# Patient Record
Sex: FEMALE | Race: WHITE | NOT HISPANIC OR LATINO | Employment: UNEMPLOYED | URBAN - METROPOLITAN AREA
[De-identification: names, ages, dates, MRNs, and addresses within clinical notes are randomized per-mention and may not be internally consistent; named-entity substitution may affect disease eponyms.]

---

## 2017-03-21 ENCOUNTER — APPOINTMENT (OUTPATIENT)
Dept: LAB | Facility: CLINIC | Age: 37
End: 2017-03-21
Payer: COMMERCIAL

## 2017-03-21 ENCOUNTER — ALLSCRIPTS OFFICE VISIT (OUTPATIENT)
Dept: OTHER | Facility: OTHER | Age: 37
End: 2017-03-21

## 2017-03-21 DIAGNOSIS — E83.52 HYPERCALCEMIA: ICD-10-CM

## 2017-03-21 DIAGNOSIS — E21.0 PRIMARY HYPERPARATHYROIDISM (HCC): ICD-10-CM

## 2017-03-21 DIAGNOSIS — Z00.00 ENCOUNTER FOR GENERAL ADULT MEDICAL EXAMINATION WITHOUT ABNORMAL FINDINGS: ICD-10-CM

## 2017-03-21 DIAGNOSIS — N39.0 URINARY TRACT INFECTION: ICD-10-CM

## 2017-03-21 LAB
25(OH)D3 SERPL-MCNC: 33.2 NG/ML (ref 30–100)
ALBUMIN SERPL BCP-MCNC: 3.4 G/DL (ref 3.5–5)
ALP SERPL-CCNC: 60 U/L (ref 46–116)
ALT SERPL W P-5'-P-CCNC: 22 U/L (ref 12–78)
ANION GAP SERPL CALCULATED.3IONS-SCNC: 6 MMOL/L (ref 4–13)
AST SERPL W P-5'-P-CCNC: 11 U/L (ref 5–45)
BACTERIA UR QL AUTO: ABNORMAL /HPF
BASOPHILS # BLD AUTO: 0.04 THOUSANDS/ΜL (ref 0–0.1)
BASOPHILS NFR BLD AUTO: 1 % (ref 0–1)
BILIRUB SERPL-MCNC: 0.38 MG/DL (ref 0.2–1)
BILIRUB UR QL STRIP: NEGATIVE
BUN SERPL-MCNC: 14 MG/DL (ref 5–25)
CALCIUM SERPL-MCNC: 9.1 MG/DL (ref 8.3–10.1)
CHLORIDE SERPL-SCNC: 109 MMOL/L (ref 100–108)
CHOLEST SERPL-MCNC: 243 MG/DL (ref 50–200)
CLARITY UR: ABNORMAL
CO2 SERPL-SCNC: 28 MMOL/L (ref 21–32)
COLOR UR: ABNORMAL
CREAT SERPL-MCNC: 0.8 MG/DL (ref 0.6–1.3)
EOSINOPHIL # BLD AUTO: 0.31 THOUSAND/ΜL (ref 0–0.61)
EOSINOPHIL NFR BLD AUTO: 5 % (ref 0–6)
ERYTHROCYTE [DISTWIDTH] IN BLOOD BY AUTOMATED COUNT: 13.7 % (ref 11.6–15.1)
GFR SERPL CREATININE-BSD FRML MDRD: >60 ML/MIN/1.73SQ M
GLUCOSE P FAST SERPL-MCNC: 82 MG/DL (ref 65–99)
GLUCOSE UR STRIP-MCNC: NEGATIVE MG/DL
HCT VFR BLD AUTO: 40.2 % (ref 34.8–46.1)
HDLC SERPL-MCNC: 70 MG/DL (ref 40–60)
HGB BLD-MCNC: 12.8 G/DL (ref 11.5–15.4)
HGB UR QL STRIP.AUTO: NEGATIVE
KETONES UR STRIP-MCNC: NEGATIVE MG/DL
LDLC SERPL CALC-MCNC: 148 MG/DL (ref 0–100)
LEUKOCYTE ESTERASE UR QL STRIP: NEGATIVE
LYMPHOCYTES # BLD AUTO: 2.11 THOUSANDS/ΜL (ref 0.6–4.47)
LYMPHOCYTES NFR BLD AUTO: 32 % (ref 14–44)
MCH RBC QN AUTO: 28.3 PG (ref 26.8–34.3)
MCHC RBC AUTO-ENTMCNC: 31.8 G/DL (ref 31.4–37.4)
MCV RBC AUTO: 89 FL (ref 82–98)
MONOCYTES # BLD AUTO: 0.5 THOUSAND/ΜL (ref 0.17–1.22)
MONOCYTES NFR BLD AUTO: 8 % (ref 4–12)
NEUTROPHILS # BLD AUTO: 3.58 THOUSANDS/ΜL (ref 1.85–7.62)
NEUTS SEG NFR BLD AUTO: 54 % (ref 43–75)
NITRITE UR QL STRIP: NEGATIVE
NON-SQ EPI CELLS URNS QL MICRO: ABNORMAL /HPF
NRBC BLD AUTO-RTO: 0 /100 WBCS
PH UR STRIP.AUTO: 7 [PH] (ref 4.5–8)
PLATELET # BLD AUTO: 390 THOUSANDS/UL (ref 149–390)
PMV BLD AUTO: 11 FL (ref 8.9–12.7)
POTASSIUM SERPL-SCNC: 4.1 MMOL/L (ref 3.5–5.3)
PROT SERPL-MCNC: 7 G/DL (ref 6.4–8.2)
PROT UR STRIP-MCNC: ABNORMAL MG/DL
PTH-INTACT SERPL-MCNC: 45 PG/ML (ref 14–72)
RBC # BLD AUTO: 4.53 MILLION/UL (ref 3.81–5.12)
RBC #/AREA URNS AUTO: ABNORMAL /HPF
SODIUM SERPL-SCNC: 143 MMOL/L (ref 136–145)
SP GR UR STRIP.AUTO: 1.03 (ref 1–1.03)
TRIGL SERPL-MCNC: 125 MG/DL
TSH SERPL DL<=0.05 MIU/L-ACNC: 0.93 UIU/ML (ref 0.36–3.74)
UROBILINOGEN UR QL STRIP.AUTO: 0.2 E.U./DL
WBC # BLD AUTO: 6.55 THOUSAND/UL (ref 4.31–10.16)
WBC #/AREA URNS AUTO: ABNORMAL /HPF

## 2017-03-21 PROCEDURE — 82306 VITAMIN D 25 HYDROXY: CPT

## 2017-03-21 PROCEDURE — 85025 COMPLETE CBC W/AUTO DIFF WBC: CPT

## 2017-03-21 PROCEDURE — 80061 LIPID PANEL: CPT

## 2017-03-21 PROCEDURE — 83970 ASSAY OF PARATHORMONE: CPT

## 2017-03-21 PROCEDURE — 80053 COMPREHEN METABOLIC PANEL: CPT

## 2017-03-21 PROCEDURE — 84443 ASSAY THYROID STIM HORMONE: CPT

## 2017-03-21 PROCEDURE — 36415 COLL VENOUS BLD VENIPUNCTURE: CPT

## 2017-03-21 PROCEDURE — 81001 URINALYSIS AUTO W/SCOPE: CPT

## 2017-03-22 ENCOUNTER — LAB (OUTPATIENT)
Dept: LAB | Facility: CLINIC | Age: 37
End: 2017-03-22
Payer: COMMERCIAL

## 2017-03-22 ENCOUNTER — TRANSCRIBE ORDERS (OUTPATIENT)
Dept: ADMINISTRATIVE | Facility: HOSPITAL | Age: 37
End: 2017-03-22

## 2017-03-22 DIAGNOSIS — N39.0 URINARY TRACT INFECTION: ICD-10-CM

## 2017-03-22 DIAGNOSIS — Z00.00 ROUTINE GENERAL MEDICAL EXAMINATION AT A HEALTH CARE FACILITY: Primary | ICD-10-CM

## 2017-03-22 PROCEDURE — 87086 URINE CULTURE/COLONY COUNT: CPT

## 2017-03-23 LAB — BACTERIA UR CULT: NORMAL

## 2017-03-29 ENCOUNTER — HOSPITAL ENCOUNTER (OUTPATIENT)
Dept: MRI IMAGING | Facility: HOSPITAL | Age: 37
Discharge: HOME | End: 2017-03-29
Payer: COMMERCIAL

## 2017-03-29 DIAGNOSIS — Z00.00 ROUTINE GENERAL MEDICAL EXAMINATION AT A HEALTH CARE FACILITY: ICD-10-CM

## 2017-03-29 PROCEDURE — 70551 MRI BRAIN STEM W/O DYE: CPT

## 2018-01-10 NOTE — PROGRESS NOTES
Assessment    1  Encounter for preventive health examination (V70 0) (Z00 00)    Plan  Health Maintenance    · Valtrex 500 MG Oral Tablet (ValACYclovir HCl)   · (1) LIPID PANEL FASTING W DIRECT LDL REFLEX; Status:Active; Requested  GPI:[id number]; Herpes simplex labialis    · ValACYclovir HCl - 500 MG Oral Tablet; TAKE 1 TABLET TWICE DAILY  Hypercalcemia, Hyperparathyroidism, primary    · (1) TSH WITH FT4 REFLEX; Status:Active; Requested for:21Mar2017;   Hyperparathyroidism, primary    · (1) CBC/PLT/DIFF; Status:Active; Requested for:21Mar2017;    · (1) COMPREHENSIVE METABOLIC PANEL; Status:Active; Requested for:21Mar2017;    · (1) PTH N-TERMINAL (INTACT); Status:Active; Requested for:21Mar2017;    · (1) URINALYSIS (will reflex a microscopy if leukocytes, occult blood, protein or nitrites  are not within normal limits); Status:Active; Requested for:21Mar2017;    · (1) VITAMIN D 25-HYDROXY; Status:Active; Requested for:21Mar2017;    · * DXA BONE DENSITY SPINE HIP AND PELVIS; Status:Hold For - Scheduling;  Requested for:21Mar2017;   PMH: History of pneumonia    · Cefuroxime Axetil 500 MG Oral Tablet   · Pulmicort Flexhaler 180 MCG/ACT Inhalation Aerosol Powder Breath Activated   · Zithromax Z-Jean Carlos 250 MG Oral Tablet (Azithromycin)    Discussion/Summary  health maintenance visit Currently, she eats a healthy diet  cervical cancer screening is needed every year Breast cancer screening: breast cancer screening is not indicated  Colorectal cancer screening: colorectal cancer screening is not indicated  Osteoporosis screening: bone mineral density testing has been ordered  Screening lab work includes thyroid function testing and 25-hydroxyvitamin D  Laboratory testing may be done today  Valtrex renewed  Follow-up visit yearly or as needed  Bone density recommended  History of Present Illness  HM, Adult Female: The patient is being seen for a health maintenance evaluation   The last health maintenance visit was 2 year(s) ago  General Health: The patient's health since the last visit is described as fair  She has regular dental visits  She denies vision problems  She denies hearing loss  Lifestyle:  She consumes a diverse and healthy diet  She does not have any weight concerns  Reproductive health: the patient is premenopausal    Screening:   metabolic screening reviewed and updated  risk screening reviewed and updated  HPI: A young woman for a maintenance examination  She has had more than her fair share of health issues considering her healthy lifestyle  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; she's had no symptoms since that time  She only recently had surgical removal of a right lower parathyroid adenoma and needs a laboratory reassessment  She gets periodic outbreaks of herpes type I of the face and lips treated without sequelae or  Review of Systems    Constitutional: no fever and no chills  ENT: no nasal discharge  Cardiovascular: no chest pain and no palpitations  Respiratory: no cough and no shortness of breath during exertion  Gastrointestinal: abdominal pain, but no nausea  Genitourinary: no dysuria and no dysmenorrhea  Musculoskeletal: no arthralgias  Integumentary: a rash, but as noted in HPI  Neurological: no headache and no fainting  Psychiatric: no anxiety and no depression  Hematologic/Lymphatic: no swollen glands, no tendency for easy bleeding and no tendency for easy bruising  Active Problems    1  Herpes simplex labialis (054 9) (B00 1)   2  Hypercalcemia (275 42) (E83 52)   3   Hyperparathyroidism, primary (252 01) (E21 0)    Past Medical History    · History of Acute upper respiratory infection (465 9) (J06 9)   · History of Brain Cancer (V10 85)   · History of Cough (786 2) (R05)   · History of acute pharyngitis (V12 69) (Z87 09)   · History of benign neoplasm of skin (V13 3) (Z87 2)   · History of influenza (V12 09) (Z87 09)   · History of shortness of breath (V13 89) (Z87 898)   · History of shortness of breath (V13 89) (Z87 898)   · History of upper respiratory infection (V12 09) (Z87 09)    Surgical History    · History of Brain Surgery   · History of  Section   · History of Foot Surgery    Family History  Mother    · Family history of Chronic Obstructive Pulmonary Disease   · Family history of Diabetes Mellitus (V18 0)   · Family history of Hypertension (V17 49)   · Family history of Pure Hypercholesterolemia   · Family history of Stroke Syndrome (V17 1)  Father    · Family history of Alcoholism   · Family history of Cirrhosis   · Family history of Diabetes Mellitus (V18 0)   · Family history of Father  At Age ___   · Family history of Heart Disease (V17 49)   · Family history of Hypertension (V17 49)  Sister    · Family history of Thyroid Disorder (V18 19)  Maternal Grandmother    · Family history of Colon Cancer (V16 0)   · Family history of Ovarian Cancer (V16 41)    Social History    · Alcohol Use (History)   · RARELY   · Never A Smoker    Current Meds   1  Cefuroxime Axetil 500 MG Oral Tablet; 1 tablet bid for 10 days; Therapy: [id number] to (Last 493 35 13)  Requested for: 44MGZ8028 Ordered   2  Multi-Vitamin TABS; Therapy: (Recorded:28Ajq7279) to Recorded   3  Ortho Tri-Cyclen TABS; TAKE 1 TABLET DAILY; Therapy: (Recorded:30Qcc9469) to Recorded   4  Pulmicort Flexhaler 180 MCG/ACT Inhalation Aerosol Powder Breath Activated; INHALE   1 PUFF TWICE DAILY  RINSE MOUTH AFTER USE; Therapy: [id number] to (Last 493 35 13)  Requested for: 468 06 892 Ordered   5  ValACYclovir HCl - 500 MG Oral Tablet; TAKE 1 TABLET TWICE DAILY; Therapy: 92TFL5159 to (Evaluate:24Pxf0256)  Requested for: 13VFG2441; Last   Rx:2016 Ordered   6  Valtrex 500 MG Oral Tablet; TAKE 1 TABLET TWICE DAILY  Requested for: 53XGO4787; Last Rx:2015 Ordered   7  Zithromax Z-Jean Carlos 250 MG Oral Tablet; TAKE 2 TABLETS ON DAY 1 THEN TAKE 1   TABLET A DAY FOR 4 DAYS; Therapy: 04SLL4155 to (Last Rx:23Nov2015)  Requested for: 23Nov2015 Ordered    Allergies    1  No Known Drug Allergies    2  Seasonal    Vitals   Recorded: 21Mar2017 09:29AM   Heart Rate 80   Systolic 549   Diastolic 80   Height 5 ft 2 in   Weight 140 lb 6 oz   BMI Calculated 25 68   BSA Calculated 1 64     Physical Exam    Constitutional   General appearance: No acute distress, well appearing and well nourished  Head and Face   Head and face: Normal     Eyes   Conjunctiva and lids: No swelling, erythema or discharge  Ophthalmoscopic examination: Normal fundi and optic discs  Neck   Neck: Supple, symmetric, trachea midline, no masses  Thyroid: Normal, no thyromegaly  Pulmonary   Auscultation of lungs: Clear to auscultation  Cardiovascular   Auscultation of heart: Normal rate and rhythm, normal S1 and S2, no murmurs  Musculoskeletal   Gait and station: Normal     Digits and nails: Normal without clubbing or cyanosis  Joints, bones, and muscles: Normal     Range of motion: Normal     Stability: Normal     Muscle strength/tone: Normal     Skin   Skin and subcutaneous tissue: Abnormal   Horizontal surgical scar of the base of the neck consistent with recent parathyroid surgery  Neurologic   Cortical function: Normal mental status  Psychiatric   Judgment and insight: Normal     Orientation to person, place, and time: Normal     Recent and remote memory: Intact      Mood and affect: Normal        Signatures   Electronically signed by : DEJUAN Dunn ; Mar 21 2017  9:50AM EST                       (Author)

## 2018-01-12 VITALS
HEIGHT: 62 IN | WEIGHT: 140.38 LBS | SYSTOLIC BLOOD PRESSURE: 102 MMHG | DIASTOLIC BLOOD PRESSURE: 80 MMHG | HEART RATE: 80 BPM | BODY MASS INDEX: 25.83 KG/M2

## 2018-03-06 ENCOUNTER — OFFICE VISIT (OUTPATIENT)
Dept: INTERNAL MEDICINE CLINIC | Facility: CLINIC | Age: 38
End: 2018-03-06
Payer: COMMERCIAL

## 2018-03-06 VITALS
OXYGEN SATURATION: 98 % | TEMPERATURE: 100.3 F | WEIGHT: 150 LBS | HEIGHT: 63 IN | HEART RATE: 102 BPM | BODY MASS INDEX: 26.58 KG/M2 | DIASTOLIC BLOOD PRESSURE: 84 MMHG | RESPIRATION RATE: 18 BRPM | SYSTOLIC BLOOD PRESSURE: 118 MMHG

## 2018-03-06 DIAGNOSIS — J11.1 FLU: Primary | ICD-10-CM

## 2018-03-06 PROCEDURE — 3008F BODY MASS INDEX DOCD: CPT | Performed by: PHYSICIAN ASSISTANT

## 2018-03-06 PROCEDURE — 99214 OFFICE O/P EST MOD 30 MIN: CPT | Performed by: PHYSICIAN ASSISTANT

## 2018-03-06 RX ORDER — OSELTAMIVIR PHOSPHATE 75 MG/1
75 CAPSULE ORAL EVERY 12 HOURS SCHEDULED
Qty: 10 CAPSULE | Refills: 0 | Status: SHIPPED | OUTPATIENT
Start: 2018-03-06 | End: 2018-03-11

## 2018-03-06 RX ORDER — BENZONATATE 200 MG/1
200 CAPSULE ORAL 3 TIMES DAILY PRN
Qty: 30 CAPSULE | Refills: 0 | Status: SHIPPED | OUTPATIENT
Start: 2018-03-06 | End: 2018-03-14 | Stop reason: ALTCHOICE

## 2018-03-06 RX ORDER — PROMETHAZINE HYDROCHLORIDE AND CODEINE PHOSPHATE 6.25; 1 MG/5ML; MG/5ML
5 SYRUP ORAL EVERY 4 HOURS PRN
Qty: 240 ML | Refills: 0 | Status: SHIPPED | OUTPATIENT
Start: 2018-03-06 | End: 2018-03-14 | Stop reason: ALTCHOICE

## 2018-03-06 RX ORDER — NORGESTIMATE AND ETHINYL ESTRADIOL
1 KIT DAILY
Refills: 4 | COMMUNITY
Start: 2018-02-19 | End: 2021-11-24

## 2018-03-06 RX ORDER — VALACYCLOVIR HYDROCHLORIDE 500 MG/1
500 TABLET, FILM COATED ORAL 2 TIMES DAILY
Refills: 1 | COMMUNITY
Start: 2017-12-26 | End: 2018-03-14 | Stop reason: SDUPTHER

## 2018-03-06 NOTE — PROGRESS NOTES
Assessment/Plan:    Flu-  Tamiflu, tessalon and phenergan with codiene  Rest and fluids    No problem-specific Assessment & Plan notes found for this encounter  Diagnoses and all orders for this visit:    Flu  -     oseltamivir (TAMIFLU) 75 mg capsule; Take 1 capsule (75 mg total) by mouth every 12 (twelve) hours for 5 days  -     promethazine-codeine (PHENERGAN WITH CODEINE) 6 25-10 mg/5 mL syrup; Take 5 mL by mouth every 4 (four) hours as needed for cough  -     benzonatate (TESSALON) 200 MG capsule; Take 1 capsule (200 mg total) by mouth 3 (three) times a day as needed for cough    Other orders  -     TRI-LO-MACIE 0 18/0 215/0 25 MG-25 MCG per tablet; Take 1 tablet by mouth daily  -     valACYclovir (VALTREX) 500 mg tablet; Take 500 mg by mouth 2 (two) times a day          Subjective:      Patient ID: Kalina Cruz is a 42 y o  female  Patient presents with symptoms that started 2 days ago  It began as headache, body aches and sweats  She did not take her temperature but felt like she had a fever  She also had some nausea but no vomiting  No diarrhea  She then developed a cough which is dry  She has a clear nasal discharge, no ear pain  Today her temperature is a 100 3  She did not take any antipyretics today  She did not take her temperature at home 2 days ago but felt like her temperature was higher  Sore Throat    Associated symptoms include coughing  Pertinent negatives include no abdominal pain, congestion, diarrhea, ear pain, shortness of breath or vomiting  Generalized Body Aches   The current episode started in the past 7 days  The problem occurs constantly  The problem is unchanged  Associated symptoms include rhinorrhea, a sore throat, fatigue, a fever, coughing and nausea  Pertinent negatives include no congestion, ear pain, chest pain, shortness of breath, wheezing, abdominal pain, diarrhea or vomiting     Fatigue   Associated symptoms include chills, coughing, diaphoresis, fatigue, a fever, myalgias, nausea and a sore throat  Pertinent negatives include no abdominal pain, chest pain, congestion or vomiting  The following portions of the patient's history were reviewed and updated as appropriate: allergies, current medications, past family history, past medical history, past social history, past surgical history and problem list     Review of Systems   Constitutional: Positive for chills, diaphoresis, fatigue and fever  HENT: Positive for rhinorrhea and sore throat  Negative for congestion and ear pain  Respiratory: Positive for cough  Negative for chest tightness, shortness of breath and wheezing  Cardiovascular: Negative for chest pain and palpitations  Gastrointestinal: Positive for nausea  Negative for abdominal pain, diarrhea and vomiting  Musculoskeletal: Positive for myalgias  Objective:      /84   Pulse 102   Temp 100 3 °F (37 9 °C)   Resp 18   Ht 5' 2 75" (1 594 m)   Wt 68 kg (150 lb)   SpO2 98%   BMI 26 78 kg/m²          Physical Exam   Constitutional: She appears well-developed and well-nourished  HENT:   Head: Normocephalic and atraumatic  Right Ear: External ear normal    Left Ear: External ear normal    Mouth/Throat: Oropharynx is clear and moist    Eyes: Conjunctivae are normal  Pupils are equal, round, and reactive to light  Neck: Normal range of motion  Neck supple  Cardiovascular: Regular rhythm and normal heart sounds  Tachycardia present  Pulmonary/Chest: Effort normal and breath sounds normal  No respiratory distress  She has no wheezes  Abdominal: Soft  Bowel sounds are normal  There is no tenderness  Lymphadenopathy:     She has no cervical adenopathy

## 2018-03-09 RX ORDER — MULTIVITAMIN
TABLET ORAL
COMMUNITY
End: 2018-03-14 | Stop reason: ALTCHOICE

## 2018-03-14 ENCOUNTER — LAB (OUTPATIENT)
Dept: LAB | Facility: CLINIC | Age: 38
End: 2018-03-14
Payer: COMMERCIAL

## 2018-03-14 ENCOUNTER — OFFICE VISIT (OUTPATIENT)
Dept: INTERNAL MEDICINE CLINIC | Facility: CLINIC | Age: 38
End: 2018-03-14
Payer: COMMERCIAL

## 2018-03-14 VITALS
SYSTOLIC BLOOD PRESSURE: 110 MMHG | OXYGEN SATURATION: 98 % | BODY MASS INDEX: 27.86 KG/M2 | HEART RATE: 92 BPM | WEIGHT: 151.4 LBS | HEIGHT: 62 IN | DIASTOLIC BLOOD PRESSURE: 70 MMHG

## 2018-03-14 DIAGNOSIS — C71.9 MALIGNANT NEOPLASM OF BRAIN, UNSPECIFIED LOCATION (HCC): ICD-10-CM

## 2018-03-14 DIAGNOSIS — E21.0 HYPERPARATHYROIDISM, PRIMARY (HCC): Primary | ICD-10-CM

## 2018-03-14 DIAGNOSIS — E21.0 HYPERPARATHYROIDISM, PRIMARY (HCC): ICD-10-CM

## 2018-03-14 DIAGNOSIS — B00.9 HERPES SIMPLEX: ICD-10-CM

## 2018-03-14 LAB
ALBUMIN SERPL BCP-MCNC: 3.3 G/DL (ref 3.5–5)
ALP SERPL-CCNC: 51 U/L (ref 46–116)
ALT SERPL W P-5'-P-CCNC: 17 U/L (ref 12–78)
ANION GAP SERPL CALCULATED.3IONS-SCNC: 7 MMOL/L (ref 4–13)
AST SERPL W P-5'-P-CCNC: 13 U/L (ref 5–45)
BACTERIA UR QL AUTO: ABNORMAL /HPF
BASOPHILS # BLD AUTO: 0.01 THOUSANDS/ΜL (ref 0–0.1)
BASOPHILS NFR BLD AUTO: 0 % (ref 0–1)
BILIRUB SERPL-MCNC: 0.65 MG/DL (ref 0.2–1)
BILIRUB UR QL STRIP: NEGATIVE
BUN SERPL-MCNC: 13 MG/DL (ref 5–25)
CALCIUM SERPL-MCNC: 8.8 MG/DL (ref 8.3–10.1)
CHLORIDE SERPL-SCNC: 105 MMOL/L (ref 100–108)
CHOLEST SERPL-MCNC: 229 MG/DL (ref 50–200)
CLARITY UR: CLEAR
CO2 SERPL-SCNC: 27 MMOL/L (ref 21–32)
COLOR UR: ABNORMAL
CREAT SERPL-MCNC: 0.7 MG/DL (ref 0.6–1.3)
EOSINOPHIL # BLD AUTO: 0.14 THOUSAND/ΜL (ref 0–0.61)
EOSINOPHIL NFR BLD AUTO: 2 % (ref 0–6)
ERYTHROCYTE [DISTWIDTH] IN BLOOD BY AUTOMATED COUNT: 13.1 % (ref 11.6–15.1)
GFR SERPL CREATININE-BSD FRML MDRD: 109 ML/MIN/1.73SQ M
GLUCOSE P FAST SERPL-MCNC: 80 MG/DL (ref 65–99)
GLUCOSE UR STRIP-MCNC: NEGATIVE MG/DL
HCT VFR BLD AUTO: 38.1 % (ref 34.8–46.1)
HDLC SERPL-MCNC: 41 MG/DL (ref 40–60)
HGB BLD-MCNC: 12.3 G/DL (ref 11.5–15.4)
HGB UR QL STRIP.AUTO: NEGATIVE
KETONES UR STRIP-MCNC: NEGATIVE MG/DL
LDLC SERPL CALC-MCNC: 114 MG/DL (ref 0–100)
LEUKOCYTE ESTERASE UR QL STRIP: NEGATIVE
LYMPHOCYTES # BLD AUTO: 2 THOUSANDS/ΜL (ref 0.6–4.47)
LYMPHOCYTES NFR BLD AUTO: 27 % (ref 14–44)
MCH RBC QN AUTO: 28.5 PG (ref 26.8–34.3)
MCHC RBC AUTO-ENTMCNC: 32.3 G/DL (ref 31.4–37.4)
MCV RBC AUTO: 88 FL (ref 82–98)
MONOCYTES # BLD AUTO: 0.57 THOUSAND/ΜL (ref 0.17–1.22)
MONOCYTES NFR BLD AUTO: 8 % (ref 4–12)
MUCOUS THREADS UR QL AUTO: ABNORMAL
NEUTROPHILS # BLD AUTO: 4.74 THOUSANDS/ΜL (ref 1.85–7.62)
NEUTS SEG NFR BLD AUTO: 63 % (ref 43–75)
NITRITE UR QL STRIP: NEGATIVE
NON-SQ EPI CELLS URNS QL MICRO: ABNORMAL /HPF
NRBC BLD AUTO-RTO: 0 /100 WBCS
PH UR STRIP.AUTO: 7 [PH] (ref 4.5–8)
PLATELET # BLD AUTO: 403 THOUSANDS/UL (ref 149–390)
PMV BLD AUTO: 10.5 FL (ref 8.9–12.7)
POTASSIUM SERPL-SCNC: 4.1 MMOL/L (ref 3.5–5.3)
PROT SERPL-MCNC: 6.9 G/DL (ref 6.4–8.2)
PROT UR STRIP-MCNC: ABNORMAL MG/DL
RBC # BLD AUTO: 4.32 MILLION/UL (ref 3.81–5.12)
RBC #/AREA URNS AUTO: ABNORMAL /HPF
SODIUM SERPL-SCNC: 139 MMOL/L (ref 136–145)
SP GR UR STRIP.AUTO: 1.03 (ref 1–1.03)
TRIGL SERPL-MCNC: 368 MG/DL
TSH SERPL DL<=0.05 MIU/L-ACNC: 0.87 UIU/ML (ref 0.36–3.74)
UROBILINOGEN UR QL STRIP.AUTO: 0.2 E.U./DL
WBC # BLD AUTO: 7.48 THOUSAND/UL (ref 4.31–10.16)
WBC #/AREA URNS AUTO: ABNORMAL /HPF

## 2018-03-14 PROCEDURE — 84443 ASSAY THYROID STIM HORMONE: CPT

## 2018-03-14 PROCEDURE — 36415 COLL VENOUS BLD VENIPUNCTURE: CPT

## 2018-03-14 PROCEDURE — 3725F SCREEN DEPRESSION PERFORMED: CPT | Performed by: INTERNAL MEDICINE

## 2018-03-14 PROCEDURE — 80061 LIPID PANEL: CPT

## 2018-03-14 PROCEDURE — 85025 COMPLETE CBC W/AUTO DIFF WBC: CPT

## 2018-03-14 PROCEDURE — 1036F TOBACCO NON-USER: CPT | Performed by: INTERNAL MEDICINE

## 2018-03-14 PROCEDURE — 99214 OFFICE O/P EST MOD 30 MIN: CPT | Performed by: INTERNAL MEDICINE

## 2018-03-14 PROCEDURE — 81001 URINALYSIS AUTO W/SCOPE: CPT | Performed by: INTERNAL MEDICINE

## 2018-03-14 PROCEDURE — 80053 COMPREHEN METABOLIC PANEL: CPT

## 2018-03-14 RX ORDER — VALACYCLOVIR HYDROCHLORIDE 500 MG/1
500 TABLET, FILM COATED ORAL 2 TIMES DAILY
Qty: 180 TABLET | Refills: 3 | Status: SHIPPED | OUTPATIENT
Start: 2018-03-14 | End: 2019-01-16 | Stop reason: SDUPTHER

## 2018-03-14 NOTE — PROGRESS NOTES
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 treated without sequelae               The following portions of the patient's history were reviewed and updated as appropriate:   She has a past medical history of Benign neoplasm of skin; Brain cancer ([facility] [address] ); Hypercalcemia; and Hyperparathyroidism, primary ([facility] [address] )  ,   does not have a problem list on file  ,   has a past surgical history that includes Brain surgery;  section; and Foot surgery  ,  family history includes Alcohol abuse in her father; COPD in her mother; Cirrhosis in her father; Colon cancer in her maternal grandmother; Diabetes in her father and mother; Heart disease in her father and mother; Hyperlipidemia in her mother; Hypertension in her father and mother; Ovarian cancer in her maternal grandmother; Stroke in her mother; Thyroid disease in her sister  ,   reports that she has quit smoking  She has never used smokeless tobacco  She reports that she does not drink alcohol or use drugs  ,  has No Known Allergies     Current Outpatient Prescriptions   Medication Sig Dispense Refill    TRI-LO-MACIE 0 18/0 215/0 25 MG-25 MCG per tablet Take 1 tablet by mouth daily  4    valACYclovir (VALTREX) 500 mg tablet Take 500 mg by mouth 2 (two) times a day  1     No current facility-administered medications for this visit          Review of Systems   Constitutional: Negative for activity change, appetite change and fever  HENT: Negative for sore throat and tinnitus  Eyes: Negative for pain and visual disturbance  Respiratory: Negative for cough, shortness of breath and wheezing  Cardiovascular: Negative for chest pain and palpitations  Gastrointestinal: Negative for abdominal pain, blood in stool, constipation, diarrhea, nausea and vomiting  Endocrine: Negative for heat intolerance, polydipsia, polyphagia and polyuria  Genitourinary: Negative for dysuria, frequency, vaginal bleeding and vaginal discharge  Musculoskeletal: Negative for arthralgias  Skin: Negative for color change and wound  Allergic/Immunologic: Negative for immunocompromised state  Neurological: Negative for dizziness, seizures, syncope and headaches  Hematological: Negative for adenopathy  Does not bruise/bleed easily  Psychiatric/Behavioral: Negative for agitation, confusion and suicidal ideas  Objective:  Vitals:    03/14/18 0902   BP: 110/70   Pulse: 92   SpO2: 98%      Physical Exam   Constitutional: She is oriented to person, place, and time  She appears well-developed and well-nourished  No distress  Appears stated age     HENT:   Head: Normocephalic and atraumatic  Eyes: Conjunctivae are normal  Pupils are equal, round, and reactive to light  Neck: Normal range of motion  No thyromegaly present  Cardiovascular: Normal rate, regular rhythm and normal heart sounds  No murmur heard  Pulmonary/Chest: Effort normal  No respiratory distress  She has no wheezes  She has no rales  Abdominal: Soft  There is no tenderness  Musculoskeletal: Normal range of motion  She exhibits no deformity  Neurological: She is alert and oriented to person, place, and time  Skin: Skin is warm and dry  Psychiatric: She has a normal mood and affect   Her behavior is normal  Judgment and thought content normal

## 2018-03-14 NOTE — PATIENT INSTRUCTIONS
A relatively young woman who, as noted, has had more than her fair share of issues but who with this time is doing well  Surveillance MRI  Surveillance laboratory testing  Removal of bowel sec live ear for treatment/prevention of recurrent herpes simplex type 1    Follow-up here in a year or earlier if needed

## 2018-03-15 DIAGNOSIS — E88.09 HYPOALBUMINEMIA: Primary | ICD-10-CM

## 2018-03-16 ENCOUNTER — LAB (OUTPATIENT)
Dept: LAB | Facility: CLINIC | Age: 38
End: 2018-03-16
Payer: COMMERCIAL

## 2018-03-16 DIAGNOSIS — E88.09 HYPOALBUMINEMIA: ICD-10-CM

## 2018-03-16 LAB
IGA SERPL-MCNC: 151 MG/DL (ref 70–400)
IGG SERPL-MCNC: 625 MG/DL (ref 700–1600)
IGM SERPL-MCNC: 86 MG/DL (ref 40–230)

## 2018-03-16 PROCEDURE — 82784 ASSAY IGA/IGD/IGG/IGM EACH: CPT

## 2018-03-16 PROCEDURE — 84165 PROTEIN E-PHORESIS SERUM: CPT | Performed by: PATHOLOGY

## 2018-03-16 PROCEDURE — 84165 PROTEIN E-PHORESIS SERUM: CPT

## 2018-03-16 PROCEDURE — 36415 COLL VENOUS BLD VENIPUNCTURE: CPT

## 2018-03-17 ENCOUNTER — APPOINTMENT (OUTPATIENT)
Dept: LAB | Facility: CLINIC | Age: 38
End: 2018-03-17
Payer: COMMERCIAL

## 2018-03-17 ENCOUNTER — TRANSCRIBE ORDERS (OUTPATIENT)
Dept: LAB | Facility: CLINIC | Age: 38
End: 2018-03-17

## 2018-03-17 DIAGNOSIS — E88.09 HYPOALBUMINEMIA: ICD-10-CM

## 2018-03-17 DIAGNOSIS — E88.09 HYPOALBUMINEMIA: Primary | ICD-10-CM

## 2018-03-17 PROCEDURE — 84156 ASSAY OF PROTEIN URINE: CPT

## 2018-03-18 LAB
PROT 24H UR-MCNC: 128 MG/24 HRS (ref 40–150)
SPECIMEN VOL UR: 1600 ML

## 2018-03-19 LAB
ALBUMIN SERPL ELPH-MCNC: 3.84 G/DL (ref 3.5–5)
ALBUMIN SERPL ELPH-MCNC: 59 % (ref 52–65)
ALPHA1 GLOB SERPL ELPH-MCNC: 0.38 G/DL (ref 0.1–0.4)
ALPHA1 GLOB SERPL ELPH-MCNC: 5.8 % (ref 2.5–5)
ALPHA2 GLOB SERPL ELPH-MCNC: 0.85 G/DL (ref 0.4–1.2)
ALPHA2 GLOB SERPL ELPH-MCNC: 13 % (ref 7–13)
BETA GLOB ABNORMAL SERPL ELPH-MCNC: 0.55 G/DL (ref 0.4–0.8)
BETA1 GLOB SERPL ELPH-MCNC: 8.5 % (ref 5–13)
BETA2 GLOB SERPL ELPH-MCNC: 4.6 % (ref 2–8)
BETA2+GAMMA GLOB SERPL ELPH-MCNC: 0.3 G/DL (ref 0.2–0.5)
GAMMA GLOB ABNORMAL SERPL ELPH-MCNC: 0.59 G/DL (ref 0.5–1.6)
GAMMA GLOB SERPL ELPH-MCNC: 9.1 % (ref 12–22)
IGG/ALB SER: 1.44 {RATIO} (ref 1.1–1.8)
PROT PATTERN SERPL ELPH-IMP: ABNORMAL
PROT SERPL-MCNC: 6.5 G/DL (ref 6.4–8.2)

## 2018-03-23 ENCOUNTER — HOSPITAL ENCOUNTER (OUTPATIENT)
Dept: MRI IMAGING | Facility: CLINIC | Age: 38
Discharge: HOME | End: 2018-03-23
Payer: COMMERCIAL

## 2018-03-23 DIAGNOSIS — C71.9 MALIGNANT NEOPLASM OF BRAIN, UNSPECIFIED LOCATION (HCC): ICD-10-CM

## 2018-03-23 PROCEDURE — 70551 MRI BRAIN STEM W/O DYE: CPT

## 2018-03-24 ENCOUNTER — TELEPHONE (OUTPATIENT)
Dept: INTERNAL MEDICINE CLINIC | Facility: CLINIC | Age: 38
End: 2018-03-24

## 2018-03-24 NOTE — TELEPHONE ENCOUNTER
----- Message from Radha Alaniz MA sent at 3/24/2018  9:17 AM EDT -----  Called patient with results      ----- Message -----  From: Shara Pal MD  Sent: 3/23/2018   6:22 PM  To: Wilian Hernandez  Internal Med Clinical    Continued stable postoperative changes in the brain MRI

## 2019-01-16 ENCOUNTER — LAB (OUTPATIENT)
Dept: LAB | Facility: CLINIC | Age: 39
End: 2019-01-16
Payer: COMMERCIAL

## 2019-01-16 ENCOUNTER — OFFICE VISIT (OUTPATIENT)
Dept: INTERNAL MEDICINE CLINIC | Facility: CLINIC | Age: 39
End: 2019-01-16
Payer: COMMERCIAL

## 2019-01-16 VITALS
OXYGEN SATURATION: 98 % | HEART RATE: 112 BPM | BODY MASS INDEX: 28.35 KG/M2 | WEIGHT: 155 LBS | SYSTOLIC BLOOD PRESSURE: 121 MMHG | DIASTOLIC BLOOD PRESSURE: 90 MMHG

## 2019-01-16 DIAGNOSIS — Z72.51 HIGH RISK HETEROSEXUAL BEHAVIOR: ICD-10-CM

## 2019-01-16 DIAGNOSIS — B00.9 HERPES SIMPLEX: ICD-10-CM

## 2019-01-16 DIAGNOSIS — Z72.51 HIGH RISK HETEROSEXUAL BEHAVIOR: Primary | ICD-10-CM

## 2019-01-16 LAB
C TRACH DNA SPEC QL NAA+PROBE: NEGATIVE
N GONORRHOEA DNA SPEC QL NAA+PROBE: NEGATIVE

## 2019-01-16 PROCEDURE — 87491 CHLMYD TRACH DNA AMP PROBE: CPT

## 2019-01-16 PROCEDURE — 86592 SYPHILIS TEST NON-TREP QUAL: CPT

## 2019-01-16 PROCEDURE — 87591 N.GONORRHOEAE DNA AMP PROB: CPT

## 2019-01-16 PROCEDURE — 36415 COLL VENOUS BLD VENIPUNCTURE: CPT

## 2019-01-16 PROCEDURE — 87389 HIV-1 AG W/HIV-1&-2 AB AG IA: CPT

## 2019-01-16 PROCEDURE — 86803 HEPATITIS C AB TEST: CPT

## 2019-01-16 PROCEDURE — 99213 OFFICE O/P EST LOW 20 MIN: CPT | Performed by: INTERNAL MEDICINE

## 2019-01-16 RX ORDER — VALACYCLOVIR HYDROCHLORIDE 500 MG/1
500 TABLET, FILM COATED ORAL 2 TIMES DAILY
Qty: 180 TABLET | Refills: 0 | Status: SHIPPED | OUTPATIENT
Start: 2019-01-16 | End: 2019-04-17 | Stop reason: SDUPTHER

## 2019-01-16 NOTE — PROGRESS NOTES
Assessment/Plan:       Diagnoses and all orders for this visit:    High risk heterosexual behavior  -     RPR; Future  -     HIV 1/2 AG-AB combo; Future  -     Chlamydia/GC amplified DNA by PCR; Future  -     Hepatitis C antibody; Future    Herpes simplex  -     valACYclovir (VALTREX) 500 mg tablet; Take 1 tablet (500 mg total) by mouth 2 (two) times a day          Patient Instructions   History of noted above  STDs screens with recheck in 6 weeks  Subjective:      Patient ID: Elisabeth Figueroa is a 34 y o  female  A female patient with no prior STD history presents  2 weeks ago she had a single episode of unprotected sex with a male partner other than her   She is asymptomatic and would like to be checked for STDs  No history of HIV, no history of hep C    Asymptomatic  No flu-like symptoms  No vaginal symptoms or lesions  The following portions of the patient's history were reviewed and updated as appropriate:   She has a past medical history of Benign neoplasm of skin; Brain cancer ([facility] [address] ); and Hyperparathyroidism, primary ([facility] )  ,   does not have any pertinent problems on file  ,   has a past surgical history that includes Brain surgery;  section; and Foot surgery  ,  family history includes Alcohol abuse in her father; COPD in her mother; Cirrhosis in her father; Colon cancer in her maternal grandmother; Diabetes in her father and mother; Heart disease in her father and mother; Hyperlipidemia in her mother; Hypertension in her father and mother; Ovarian cancer in her maternal grandmother; Stroke in her mother; Thyroid disease in her sister  ,   reports that she has quit smoking  She has never used smokeless tobacco  She reports that she does not drink alcohol or use drugs  ,  has No Known Allergies     Current Outpatient Prescriptions   Medication Sig Dispense Refill    TRI-LO-MACIE 0 18/0 215/0 25 MG-25 MCG per tablet Take 1 tablet by mouth daily  4    valACYclovir (VALTREX) 500 mg tablet Take 1 tablet (500 mg total) by mouth 2 (two) times a day 180 tablet 0     No current facility-administered medications for this visit  Review of Systems   Psychiatric/Behavioral: Positive for dysphoric mood  The patient is nervous/anxious  All other systems reviewed and are negative  Objective:  Vitals:    01/16/19 1302   BP: 121/90   Pulse: (!) 112   SpO2: 98%      Physical Exam   Constitutional: She appears well-developed and well-nourished  Pulmonary/Chest: Effort normal    Neurological: She is alert  Psychiatric: She exhibits a depressed mood

## 2019-01-17 LAB
HCV AB SER QL: NORMAL
RPR SER QL: NORMAL

## 2019-01-18 LAB — HIV 1+2 AB+HIV1 P24 AG SERPL QL IA: NORMAL

## 2019-02-28 ENCOUNTER — OFFICE VISIT (OUTPATIENT)
Dept: INTERNAL MEDICINE CLINIC | Facility: CLINIC | Age: 39
End: 2019-02-28
Payer: COMMERCIAL

## 2019-02-28 VITALS
HEIGHT: 62 IN | OXYGEN SATURATION: 98 % | DIASTOLIC BLOOD PRESSURE: 78 MMHG | WEIGHT: 156 LBS | BODY MASS INDEX: 28.71 KG/M2 | HEART RATE: 81 BPM | SYSTOLIC BLOOD PRESSURE: 132 MMHG

## 2019-02-28 DIAGNOSIS — F41.8 ANXIOUS DEPRESSION: Primary | ICD-10-CM

## 2019-02-28 PROBLEM — Z72.51 HIGH RISK HETEROSEXUAL BEHAVIOR: Status: RESOLVED | Noted: 2019-01-16 | Resolved: 2019-02-28

## 2019-02-28 PROBLEM — C71.9 BRAIN CANCER (HCC): Status: RESOLVED | Noted: 2018-03-14 | Resolved: 2019-02-28

## 2019-02-28 PROCEDURE — 99213 OFFICE O/P EST LOW 20 MIN: CPT | Performed by: INTERNAL MEDICINE

## 2019-02-28 PROCEDURE — 3008F BODY MASS INDEX DOCD: CPT | Performed by: INTERNAL MEDICINE

## 2019-02-28 RX ORDER — VENLAFAXINE HYDROCHLORIDE 37.5 MG/1
37.5 TABLET, EXTENDED RELEASE ORAL
Qty: 30 TABLET | Refills: 5 | Status: SHIPPED | OUTPATIENT
Start: 2019-02-28 | End: 2020-02-05

## 2019-02-28 NOTE — PATIENT INSTRUCTIONS
Therapeutic trial of Effexor 37 5 mg once a day  This is a low dose  Call me in a couple of weeks to report how things are going

## 2019-02-28 NOTE — PROGRESS NOTES
Assessment/Plan:       Diagnoses and all orders for this visit:    Anxious depression  -     venlafaxine 37 5 mg 24 hr tablet; Take 1 tablet (37 5 mg total) by mouth daily with breakfast          Patient Instructions   Therapeutic trial of Effexor 37 5 mg once a day  This is a low dose  Call me in a couple of weeks to report how things are going  Subjective:      Patient ID: Cyn Caballero is a 34 y o  female  Follow-up visit of a 53-year-old female with a degree of anxious depression  She has been having some marital issues and is going to counseling  The counselor is a psychiatrist   She reported mood swings and crying jags  The catheter suggested a trial of an antidepressant  Part of the package includes low energy  The following portions of the patient's history were reviewed and updated as appropriate:   She has a past medical history of Benign neoplasm of skin, Brain cancer ([facility] [address] ), and Hyperparathyroidism, primary ([facility] [address] )  ,  does not have any pertinent problems on file  ,   has a past surgical history that includes Brain surgery;  section; and Foot surgery  ,  family history includes Alcohol abuse in her father; COPD in her mother; Cirrhosis in her father; Colon cancer in her maternal grandmother; Diabetes in her father and mother; Heart disease in her father and mother; Hyperlipidemia in her mother; Hypertension in her father and mother; Ovarian cancer in her maternal grandmother; Stroke in her mother; Thyroid disease in her sister  ,   reports that she quit smoking about 18 years ago  She has a 7 50 pack-year smoking history  She has never used smokeless tobacco  She reports that she does not drink alcohol or use drugs  ,  has No Known Allergies     Current Outpatient Medications   Medication Sig Dispense Refill    TRI-LO-MACIE 0 18/0 215/0 25 MG-25 MCG per tablet Take 1 tablet by mouth daily  4    valACYclovir (VALTREX) 500 mg tablet Take 1 tablet (500 mg total) by mouth 2 (two) times a day 180 tablet 0    venlafaxine 37 5 mg 24 hr tablet Take 1 tablet (37 5 mg total) by mouth daily with breakfast 30 tablet 5     No current facility-administered medications for this visit  Review of Systems   Constitutional: Positive for fatigue  Negative for unexpected weight change  Psychiatric/Behavioral: Positive for dysphoric mood  Negative for self-injury and suicidal ideas  The patient is nervous/anxious  Objective:  Vitals:    02/28/19 0936   BP: 132/78   Pulse: 81   SpO2: 98%      Physical Exam   Constitutional: She is oriented to person, place, and time  She appears well-developed and well-nourished  No distress  An alert female patient who appears stated age   Neurological: She is alert and oriented to person, place, and time

## 2019-03-17 ENCOUNTER — OFFICE VISIT (OUTPATIENT)
Dept: URGENT CARE | Facility: CLINIC | Age: 39
End: 2019-03-17
Payer: COMMERCIAL

## 2019-03-17 VITALS
TEMPERATURE: 98.2 F | BODY MASS INDEX: 29.26 KG/M2 | HEIGHT: 62 IN | RESPIRATION RATE: 16 BRPM | WEIGHT: 159 LBS | HEART RATE: 90 BPM | OXYGEN SATURATION: 99 % | DIASTOLIC BLOOD PRESSURE: 80 MMHG | SYSTOLIC BLOOD PRESSURE: 126 MMHG

## 2019-03-17 DIAGNOSIS — R22.0 LIP SWELLING: Primary | ICD-10-CM

## 2019-03-17 PROCEDURE — 99203 OFFICE O/P NEW LOW 30 MIN: CPT | Performed by: PHYSICIAN ASSISTANT

## 2019-03-17 PROCEDURE — G0382 LEV 3 HOSP TYPE B ED VISIT: HCPCS | Performed by: PHYSICIAN ASSISTANT

## 2019-03-17 PROCEDURE — 99283 EMERGENCY DEPT VISIT LOW MDM: CPT | Performed by: PHYSICIAN ASSISTANT

## 2019-03-17 RX ORDER — PREDNISONE 10 MG/1
TABLET ORAL
Qty: 18 TABLET | Refills: 0 | Status: SHIPPED | OUTPATIENT
Start: 2019-03-17 | End: 2020-02-05

## 2019-03-17 NOTE — PROGRESS NOTES
[facility]        NAME: Salomon Recinos is a 34 y o  female  : 1980    MRN: [id number]  DATE: 2019  TIME: 11:18 AM    Assessment and Plan   Lip swelling [R22 0]  1  Lip swelling  predniSONE 10 mg tablet         Patient Instructions     Patient Instructions   Ice the lip, if trouble breathing or swallowing go to ER  Follow up with PCP in 3-5 days  Proceed to  ER if symptoms worsen  Chief Complaint     Chief Complaint   Patient presents with    Lip Swelling     Lower lip; pt states 3 days ago developed cold sores which she gets frequently but then her lower lip became swollen yesterday  Pt took benadryl this morning without much relief, did not did valtrex this time- states it's too late to take it now and wouldnt work for her at this point         History of Present Illness         49-year-old female complains of lower lip swelling for several days  His progressive in got worse  She denies trouble swallowing or breathing  No sore throat  She says she does have a history of cold sores and herpes  She did not start the Valtrex because it was too late  She needs to started within the 1st day of symptoms  She does not have any cough or runny nose  No chest pain or shortness of breath  No nausea or vomiting        Review of Systems   Review of Systems      Current Medications       Current Outpatient Medications:     TRI-LO-MACIE 0 18/0 215/0 25 MG-25 MCG per tablet, Take 1 tablet by mouth daily, Disp: , Rfl: 4    valACYclovir (VALTREX) 500 mg tablet, Take 1 tablet (500 mg total) by mouth 2 (two) times a day, Disp: 180 tablet, Rfl: 0    venlafaxine 37 5 mg 24 hr tablet, Take 1 tablet (37 5 mg total) by mouth daily with breakfast, Disp: 30 tablet, Rfl: 5    predniSONE 10 mg tablet, 3 tabs daily for 3 days, 2 tabs daily for 3 days, 1 tab daily for 3 days, Disp: 18 tablet, Rfl: 0    Current Allergies     Allergies as of 2019    (No Known Allergies)            The following portions of the patient's history were reviewed and updated as appropriate: allergies, current medications, past family history, past medical history, past social history, past surgical history and problem list      Past Medical History:   Diagnosis Date    Benign neoplasm of skin     [facility] [address] [facility]     Hyperparathyroidism, primary ([facility] [address] )     last assessed 2015       Past Surgical History:   Procedure Laterality Date   [facility]      Neurosurgery for brain surgery;  last assessed [id number]     SECTION      FOOT SURGERY         Family History   Problem Relation Age of Onset    Diabetes Mother     Heart disease Mother     COPD Mother     Hypertension Mother     Hyperlipidemia Mother     Stroke Mother     Diabetes Father     Heart disease Father     Alcohol abuse Father     Cirrhosis Father     Hypertension Father     Colon cancer Maternal Grandmother     Ovarian cancer Maternal Grandmother     Thyroid disease Sister          Medications have been verified  Objective   /80   Pulse 90   Temp 98 2 °F (36 8 °C) (Tympanic)   Resp 16   Ht 5' 2" (1 575 m)   Wt 72 1 kg (159 lb)   SpO2 99%   BMI 29 08 kg/m²        Physical Exam     Physical Exam   Constitutional: She appears well-developed and well-nourished  No distress  HENT:   Right Ear: Tympanic membrane, external ear and ear canal normal    Left Ear: Tympanic membrane, external ear and ear canal normal    Nose: Nose normal  Right sinus exhibits no maxillary sinus tenderness and no frontal sinus tenderness  Left sinus exhibits no maxillary sinus tenderness and no frontal sinus tenderness  Mouth/Throat: No posterior oropharyngeal edema or posterior oropharyngeal erythema  Lower lip  Edematous but supple  Not indurated  She does have some vesicles on the lower lip  No crusting or drainage  No erythema of the skin surrounding the mouth  No lesions in the mouth     Eyes: Pupils are equal, round, and reactive to light  Conjunctivae and EOM are normal  No scleral icterus  Neck: Normal range of motion  Neck supple  Cardiovascular: Normal rate, regular rhythm and normal heart sounds  Pulmonary/Chest: Effort normal and breath sounds normal  No respiratory distress  She has no wheezes  She has no rales  Abdominal: Soft  Bowel sounds are normal  She exhibits no distension and no mass  There is no tenderness  There is no rebound and no guarding  Lymphadenopathy:     She has no cervical adenopathy  Skin: Skin is warm and dry  No rash noted

## 2019-04-17 DIAGNOSIS — B00.9 HERPES SIMPLEX: ICD-10-CM

## 2019-04-17 RX ORDER — VALACYCLOVIR HYDROCHLORIDE 500 MG/1
TABLET, FILM COATED ORAL
Qty: 180 TABLET | Refills: 3 | Status: SHIPPED | OUTPATIENT
Start: 2019-04-17 | End: 2020-04-15

## 2020-02-05 ENCOUNTER — LAB (OUTPATIENT)
Dept: LAB | Facility: CLINIC | Age: 40
End: 2020-02-05
Payer: COMMERCIAL

## 2020-02-05 ENCOUNTER — OFFICE VISIT (OUTPATIENT)
Dept: INTERNAL MEDICINE CLINIC | Facility: CLINIC | Age: 40
End: 2020-02-05
Payer: COMMERCIAL

## 2020-02-05 VITALS
HEART RATE: 94 BPM | SYSTOLIC BLOOD PRESSURE: 118 MMHG | BODY MASS INDEX: 27.93 KG/M2 | DIASTOLIC BLOOD PRESSURE: 84 MMHG | HEIGHT: 63 IN | OXYGEN SATURATION: 98 % | WEIGHT: 157.6 LBS

## 2020-02-05 DIAGNOSIS — B00.9 HERPES SIMPLEX: ICD-10-CM

## 2020-02-05 DIAGNOSIS — E21.0 HYPERPARATHYROIDISM, PRIMARY (HCC): ICD-10-CM

## 2020-02-05 DIAGNOSIS — F41.8 ANXIOUS DEPRESSION: Primary | ICD-10-CM

## 2020-02-05 DIAGNOSIS — F41.8 ANXIOUS DEPRESSION: ICD-10-CM

## 2020-02-05 DIAGNOSIS — C71.9 EPENDYMOMA OF BRAIN (HCC): ICD-10-CM

## 2020-02-05 DIAGNOSIS — L60.3 NAIL DYSTROPHY: ICD-10-CM

## 2020-02-05 PROBLEM — R53.82 CHRONIC FATIGUE: Status: ACTIVE | Noted: 2020-02-05

## 2020-02-05 LAB
25(OH)D3 SERPL-MCNC: 28.2 NG/ML (ref 30–100)
ALBUMIN SERPL BCP-MCNC: 3.5 G/DL (ref 3.5–5)
ALP SERPL-CCNC: 61 U/L (ref 46–116)
ALT SERPL W P-5'-P-CCNC: 23 U/L (ref 12–78)
ANION GAP SERPL CALCULATED.3IONS-SCNC: 6 MMOL/L (ref 4–13)
AST SERPL W P-5'-P-CCNC: 12 U/L (ref 5–45)
BACTERIA UR QL AUTO: ABNORMAL /HPF
BASOPHILS # BLD AUTO: 0.02 THOUSANDS/ΜL (ref 0–0.1)
BASOPHILS NFR BLD AUTO: 0 % (ref 0–1)
BILIRUB SERPL-MCNC: 0.39 MG/DL (ref 0.2–1)
BILIRUB UR QL STRIP: NEGATIVE
BUN SERPL-MCNC: 13 MG/DL (ref 5–25)
CALCIUM SERPL-MCNC: 8.8 MG/DL (ref 8.3–10.1)
CHLORIDE SERPL-SCNC: 109 MMOL/L (ref 100–108)
CHOLEST SERPL-MCNC: 223 MG/DL (ref 50–200)
CLARITY UR: ABNORMAL
CO2 SERPL-SCNC: 25 MMOL/L (ref 21–32)
COLOR UR: ABNORMAL
CREAT SERPL-MCNC: 0.86 MG/DL (ref 0.6–1.3)
EOSINOPHIL # BLD AUTO: 0.1 THOUSAND/ΜL (ref 0–0.61)
EOSINOPHIL NFR BLD AUTO: 2 % (ref 0–6)
ERYTHROCYTE [DISTWIDTH] IN BLOOD BY AUTOMATED COUNT: 13.9 % (ref 11.6–15.1)
FOLATE SERPL-MCNC: 19 NG/ML (ref 3.1–17.5)
GFR SERPL CREATININE-BSD FRML MDRD: 84 ML/MIN/1.73SQ M
GLUCOSE P FAST SERPL-MCNC: 90 MG/DL (ref 65–99)
GLUCOSE UR STRIP-MCNC: NEGATIVE MG/DL
HCT VFR BLD AUTO: 39.8 % (ref 34.8–46.1)
HDLC SERPL-MCNC: 38 MG/DL
HGB BLD-MCNC: 12.3 G/DL (ref 11.5–15.4)
HGB UR QL STRIP.AUTO: NEGATIVE
IMM GRANULOCYTES # BLD AUTO: 0.01 THOUSAND/UL (ref 0–0.2)
IMM GRANULOCYTES NFR BLD AUTO: 0 % (ref 0–2)
IRON SERPL-MCNC: 77 UG/DL (ref 50–170)
KETONES UR STRIP-MCNC: ABNORMAL MG/DL
LDLC SERPL CALC-MCNC: 121 MG/DL (ref 0–100)
LEUKOCYTE ESTERASE UR QL STRIP: NEGATIVE
LYMPHOCYTES # BLD AUTO: 1.77 THOUSANDS/ΜL (ref 0.6–4.47)
LYMPHOCYTES NFR BLD AUTO: 32 % (ref 14–44)
MCH RBC QN AUTO: 28.1 PG (ref 26.8–34.3)
MCHC RBC AUTO-ENTMCNC: 30.9 G/DL (ref 31.4–37.4)
MCV RBC AUTO: 91 FL (ref 82–98)
MONOCYTES # BLD AUTO: 0.56 THOUSAND/ΜL (ref 0.17–1.22)
MONOCYTES NFR BLD AUTO: 10 % (ref 4–12)
MUCOUS THREADS UR QL AUTO: ABNORMAL
NEUTROPHILS # BLD AUTO: 3 THOUSANDS/ΜL (ref 1.85–7.62)
NEUTS SEG NFR BLD AUTO: 56 % (ref 43–75)
NITRITE UR QL STRIP: NEGATIVE
NON-SQ EPI CELLS URNS QL MICRO: ABNORMAL /HPF
NRBC BLD AUTO-RTO: 0 /100 WBCS
PH UR STRIP.AUTO: 6 [PH]
PLATELET # BLD AUTO: 343 THOUSANDS/UL (ref 149–390)
PMV BLD AUTO: 10.9 FL (ref 8.9–12.7)
POTASSIUM SERPL-SCNC: 3.9 MMOL/L (ref 3.5–5.3)
PROT SERPL-MCNC: 7.1 G/DL (ref 6.4–8.2)
PROT UR STRIP-MCNC: ABNORMAL MG/DL
RBC # BLD AUTO: 4.38 MILLION/UL (ref 3.81–5.12)
RBC #/AREA URNS AUTO: ABNORMAL /HPF
SODIUM SERPL-SCNC: 140 MMOL/L (ref 136–145)
SP GR UR STRIP.AUTO: 1.03 (ref 1–1.03)
TRIGL SERPL-MCNC: 321 MG/DL
TSH SERPL DL<=0.05 MIU/L-ACNC: 1.1 UIU/ML (ref 0.36–3.74)
UROBILINOGEN UR QL STRIP.AUTO: 0.2 E.U./DL
VIT B12 SERPL-MCNC: 402 PG/ML (ref 100–900)
WBC # BLD AUTO: 5.46 THOUSAND/UL (ref 4.31–10.16)
WBC #/AREA URNS AUTO: ABNORMAL /HPF

## 2020-02-05 PROCEDURE — 36415 COLL VENOUS BLD VENIPUNCTURE: CPT

## 2020-02-05 PROCEDURE — 85025 COMPLETE CBC W/AUTO DIFF WBC: CPT

## 2020-02-05 PROCEDURE — 80061 LIPID PANEL: CPT

## 2020-02-05 PROCEDURE — 80053 COMPREHEN METABOLIC PANEL: CPT

## 2020-02-05 PROCEDURE — 83540 ASSAY OF IRON: CPT

## 2020-02-05 PROCEDURE — 1036F TOBACCO NON-USER: CPT | Performed by: INTERNAL MEDICINE

## 2020-02-05 PROCEDURE — 84443 ASSAY THYROID STIM HORMONE: CPT

## 2020-02-05 PROCEDURE — 82306 VITAMIN D 25 HYDROXY: CPT

## 2020-02-05 PROCEDURE — 82746 ASSAY OF FOLIC ACID SERUM: CPT

## 2020-02-05 PROCEDURE — 81001 URINALYSIS AUTO W/SCOPE: CPT | Performed by: INTERNAL MEDICINE

## 2020-02-05 PROCEDURE — 99214 OFFICE O/P EST MOD 30 MIN: CPT | Performed by: INTERNAL MEDICINE

## 2020-02-05 PROCEDURE — 82607 VITAMIN B-12: CPT

## 2020-02-05 PROCEDURE — 3008F BODY MASS INDEX DOCD: CPT | Performed by: INTERNAL MEDICINE

## 2020-02-05 NOTE — PROGRESS NOTES
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[facility] )  -     MRI brain wo contrast; Future    Hyperparathyroidism, primary ([facility] )  -     CBC and differential; Future  -     Lipid Panel with Direct LDL reflex; Future  -     Comprehensive metabolic panel; Future  -     TSH, 3rd generation with Free T4 reflex; Future  -     UA (URINE) with reflex to Scope  -     Folate; Future  -     Iron; Future  -     Vitamin B12; Future  -     Vitamin D 25 hydroxy; Future    Nail dystrophy  -     CBC and differential; Future  -     Lipid Panel with Direct LDL reflex; Future  -     Comprehensive metabolic panel; Future  -     TSH, 3rd generation with Free T4 reflex; Future  -     UA (URINE) with reflex to Scope  -     Folate; Future  -     Iron; Future  -     Vitamin B12; Future  -     Vitamin D 25 hydroxy; Future                Subjective:      Patient ID: Ailin Dodd is a 37 y o  female  A young woman with too many health issues although fortunately she seems to have nothing physically acute  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   Parathyroid adenoma about 3/4 years ago which presented is asymptomatic hypercalcemia    Current issues principally of anxious depression  Her  is bipolar and there marriages falling apart  She is trying to hang in there  No cigarettes, rare alcohol, no illicit drugs    Not currently working outside the house   Her only physical complaint is that of feeling fatigue and tiredness; I believe it is most likely due to her anxious depression but we will have to screen her for any laboratory anomalies as her physical exam is normal     Nodes ridges on her nails  Frequent outbreaks of herpes labialis 1 which she treats with Valtrex  The following portions of the patient's history were reviewed and updated as appropriate:   She has a past medical history of Benign neoplasm of skin, Brain cancer ([facility] [address] ), and Hyperparathyroidism, primary ([facility] [address] )  ,  does not have any pertinent problems on file  ,   has a past surgical history that includes Brain surgery;  section; and Foot surgery  ,  family history includes Alcohol abuse in her father; COPD in her mother; Cirrhosis in her father; Colon cancer in her maternal grandmother; Diabetes in her father and mother; Heart disease in her father and mother; Hyperlipidemia in her mother; Hypertension in her father and mother; Ovarian cancer in her maternal grandmother; Stroke in her mother; Thyroid disease in her sister  ,   reports that she quit smoking about 19 years ago  Her smoking use included cigarettes  She has a 7 50 pack-year smoking history  She has never used smokeless tobacco  She reports that she does not drink alcohol or use drugs  ,  is allergic to pollen extract     Current Outpatient Medications   Medication Sig Dispense Refill    TRI-LO-MACIE 0 18/0 215/0 25 MG-25 MCG per tablet Take 1 tablet by mouth daily  4    valACYclovir (VALTREX) 500 mg tablet TAKE 1 TABLET BY MOUTH TWICE A DAY (Patient taking differently: Take 1,000 mg by mouth as needed ) 180 tablet 3     No current facility-administered medications for this visit  Review of Systems   Constitutional: Positive for fatigue  Negative for chills and fever  HENT: Negative for sore throat and trouble swallowing  Eyes: Negative for pain  Respiratory: Negative for cough, shortness of breath and wheezing  Cardiovascular: Negative for chest pain and leg swelling     Gastrointestinal: Negative for abdominal pain, diarrhea, nausea and vomiting  Endocrine: Negative for cold intolerance and heat intolerance  Genitourinary: Negative for dysuria, frequency and pelvic pain  Musculoskeletal: Negative for arthralgias and joint swelling  Skin: Negative for rash and wound  Allergic/Immunologic: Negative for immunocompromised state  Neurological: Negative for dizziness, seizures, syncope and headaches  Psychiatric/Behavioral: Negative for dysphoric mood  The patient is not nervous/anxious  Objective:  Vitals:    02/05/20 0747   BP: 118/84   Pulse: 94   SpO2: 98%      Physical Exam   Constitutional: She is oriented to person, place, and time  She appears well-developed and well-nourished  Alert pleasant female patient moderately overweight and just a bit anxious   HENT:   Head: Normocephalic and atraumatic  Eyes: Pupils are equal, round, and reactive to light  EOM are normal    Neck: Normal range of motion  Neck supple  No tracheal deviation present  No thyromegaly present  Cardiovascular: Normal rate, regular rhythm and normal heart sounds  Exam reveals no gallop  No murmur heard  Pulmonary/Chest: No respiratory distress  She has no wheezes  She has no rales  Abdominal: Soft  Bowel sounds are normal  There is no tenderness  Musculoskeletal: Normal range of motion  She exhibits no tenderness or deformity  Neurological: She is alert and oriented to person, place, and time  Coordination normal    Skin: Skin is warm  Psychiatric: Her speech is normal and behavior is normal  Judgment and thought content normal  Her mood appears anxious  Patient Instructions    A patient with the above issues  Imaging of brain   Multiple laboratory screens  Follow-up in June

## 2020-02-05 NOTE — PROGRESS NOTES
BMI Counseling: Body mass index is 27 92 kg/m²  The BMI is above normal  Nutrition recommendations include decreasing portion sizes  Exercise recommendations include moderate physical activity 150 minutes/week

## 2020-02-05 NOTE — PATIENT INSTRUCTIONS
A patient with the above issues  Imaging of brain   Multiple laboratory screens  Follow-up in June 
Principal Discharge DX:	Fever

## 2020-02-17 ENCOUNTER — HOSPITAL ENCOUNTER (OUTPATIENT)
Dept: MRI IMAGING | Facility: CLINIC | Age: 40
Discharge: HOME | End: 2020-02-17
Payer: COMMERCIAL

## 2020-02-17 DIAGNOSIS — C71.9 EPENDYMOMA OF BRAIN (HCC): ICD-10-CM

## 2020-02-17 PROCEDURE — 70551 MRI BRAIN STEM W/O DYE: CPT

## 2020-04-15 DIAGNOSIS — B00.9 HERPES SIMPLEX: ICD-10-CM

## 2020-04-15 RX ORDER — VALACYCLOVIR HYDROCHLORIDE 500 MG/1
TABLET, FILM COATED ORAL
Qty: 60 TABLET | Refills: 11 | Status: SHIPPED | OUTPATIENT
Start: 2020-04-15 | End: 2021-08-18 | Stop reason: SDUPTHER

## 2020-07-10 ENCOUNTER — TELEPHONE (OUTPATIENT)
Dept: ADMINISTRATIVE | Facility: OTHER | Age: 40
End: 2020-07-10

## 2020-07-10 ENCOUNTER — OFFICE VISIT (OUTPATIENT)
Dept: INTERNAL MEDICINE CLINIC | Facility: CLINIC | Age: 40
End: 2020-07-10
Payer: COMMERCIAL

## 2020-07-10 DIAGNOSIS — Z00.00 HEALTHCARE MAINTENANCE: Primary | ICD-10-CM

## 2020-07-10 PROCEDURE — 99211 OFF/OP EST MAY X REQ PHY/QHP: CPT | Performed by: INTERNAL MEDICINE

## 2020-07-10 NOTE — TELEPHONE ENCOUNTER
----- Message from Amita Bender MA sent at 7/10/2020  9:29 AM EDT -----  Regarding: [facility] internal; mammo  Contact: [phone]  07/10/20 9:30 AM    Hello, our patient Delgado Terrence has had Mammogram completed/performed  Please assist in updating the patient chart by pulling the Care Everywhere (CE) document  The date of service is 10/2019       Thank you,  Amita Bender MA  [facility]

## 2020-07-10 NOTE — TELEPHONE ENCOUNTER
Upon review of the In Basket request we were able to locate, review, and update the patient chart as requested for Mammogram     Any additional questions or concerns should be emailed to the Practice Liaisons via [email or web address] email, please do not reply via In Basket      Thank you  Masha Avila MA

## 2020-09-03 ENCOUNTER — TELEPHONE (OUTPATIENT)
Dept: DERMATOLOGY | Facility: CLINIC | Age: 40
End: 2020-09-03

## 2020-09-04 ENCOUNTER — APPOINTMENT (OUTPATIENT)
Dept: LAB | Facility: CLINIC | Age: 40
End: 2020-09-04
Payer: COMMERCIAL

## 2020-09-04 ENCOUNTER — OFFICE VISIT (OUTPATIENT)
Dept: INTERNAL MEDICINE CLINIC | Facility: CLINIC | Age: 40
End: 2020-09-04
Payer: COMMERCIAL

## 2020-09-04 ENCOUNTER — TELEPHONE (OUTPATIENT)
Dept: DERMATOLOGY | Facility: CLINIC | Age: 40
End: 2020-09-04

## 2020-09-04 VITALS
DIASTOLIC BLOOD PRESSURE: 100 MMHG | OXYGEN SATURATION: 98 % | SYSTOLIC BLOOD PRESSURE: 132 MMHG | BODY MASS INDEX: 29.41 KG/M2 | HEART RATE: 77 BPM | TEMPERATURE: 99 F | WEIGHT: 166 LBS | HEIGHT: 63 IN

## 2020-09-04 DIAGNOSIS — D69.2 PURPURA (HCC): Primary | ICD-10-CM

## 2020-09-04 DIAGNOSIS — D69.2 PURPURA (HCC): ICD-10-CM

## 2020-09-04 LAB
ALBUMIN SERPL BCP-MCNC: 3.7 G/DL (ref 3.5–5)
ALP SERPL-CCNC: 67 U/L (ref 46–116)
ALT SERPL W P-5'-P-CCNC: 17 U/L (ref 12–78)
ANION GAP SERPL CALCULATED.3IONS-SCNC: 6 MMOL/L (ref 4–13)
AST SERPL W P-5'-P-CCNC: 9 U/L (ref 5–45)
BACTERIA UR QL AUTO: ABNORMAL /HPF
BASOPHILS # BLD AUTO: 0.06 THOUSANDS/ΜL (ref 0–0.1)
BASOPHILS NFR BLD AUTO: 1 % (ref 0–1)
BILIRUB SERPL-MCNC: 0.29 MG/DL (ref 0.2–1)
BILIRUB UR QL STRIP: NEGATIVE
BUN SERPL-MCNC: 12 MG/DL (ref 5–25)
CALCIUM SERPL-MCNC: 9.5 MG/DL (ref 8.3–10.1)
CHLORIDE SERPL-SCNC: 109 MMOL/L (ref 100–108)
CLARITY UR: ABNORMAL
CO2 SERPL-SCNC: 24 MMOL/L (ref 21–32)
COLOR UR: YELLOW
CREAT SERPL-MCNC: 0.77 MG/DL (ref 0.6–1.3)
CRP SERPL HS-MCNC: 13 MG/L
EOSINOPHIL # BLD AUTO: 0.27 THOUSAND/ΜL (ref 0–0.61)
EOSINOPHIL NFR BLD AUTO: 3 % (ref 0–6)
ERYTHROCYTE [DISTWIDTH] IN BLOOD BY AUTOMATED COUNT: 13.9 % (ref 11.6–15.1)
ERYTHROCYTE [SEDIMENTATION RATE] IN BLOOD: 26 MM/HOUR (ref 0–19)
GFR SERPL CREATININE-BSD FRML MDRD: 96 ML/MIN/1.73SQ M
GLUCOSE P FAST SERPL-MCNC: 80 MG/DL (ref 65–99)
GLUCOSE UR STRIP-MCNC: NEGATIVE MG/DL
HCT VFR BLD AUTO: 39.4 % (ref 34.8–46.1)
HGB BLD-MCNC: 12.4 G/DL (ref 11.5–15.4)
HGB UR QL STRIP.AUTO: ABNORMAL
HYALINE CASTS #/AREA URNS LPF: ABNORMAL /LPF
IMM GRANULOCYTES # BLD AUTO: 0.04 THOUSAND/UL (ref 0–0.2)
IMM GRANULOCYTES NFR BLD AUTO: 0 % (ref 0–2)
KETONES UR STRIP-MCNC: NEGATIVE MG/DL
LEUKOCYTE ESTERASE UR QL STRIP: NEGATIVE
LYMPHOCYTES # BLD AUTO: 2 THOUSANDS/ΜL (ref 0.6–4.47)
LYMPHOCYTES NFR BLD AUTO: 19 % (ref 14–44)
MCH RBC QN AUTO: 28.7 PG (ref 26.8–34.3)
MCHC RBC AUTO-ENTMCNC: 31.5 G/DL (ref 31.4–37.4)
MCV RBC AUTO: 91 FL (ref 82–98)
MONOCYTES # BLD AUTO: 0.7 THOUSAND/ΜL (ref 0.17–1.22)
MONOCYTES NFR BLD AUTO: 7 % (ref 4–12)
NEUTROPHILS # BLD AUTO: 7.55 THOUSANDS/ΜL (ref 1.85–7.62)
NEUTS SEG NFR BLD AUTO: 70 % (ref 43–75)
NITRITE UR QL STRIP: NEGATIVE
NON-SQ EPI CELLS URNS QL MICRO: ABNORMAL /HPF
NRBC BLD AUTO-RTO: 0 /100 WBCS
PH UR STRIP.AUTO: 6.5 [PH]
PLATELET # BLD AUTO: 403 THOUSANDS/UL (ref 149–390)
PMV BLD AUTO: 10.9 FL (ref 8.9–12.7)
POTASSIUM SERPL-SCNC: 4.2 MMOL/L (ref 3.5–5.3)
PROT SERPL-MCNC: 7.6 G/DL (ref 6.4–8.2)
PROT UR STRIP-MCNC: NEGATIVE MG/DL
RBC # BLD AUTO: 4.32 MILLION/UL (ref 3.81–5.12)
RBC #/AREA URNS AUTO: ABNORMAL /HPF
SODIUM SERPL-SCNC: 139 MMOL/L (ref 136–145)
SP GR UR STRIP.AUTO: 1.01 (ref 1–1.03)
TSH SERPL DL<=0.05 MIU/L-ACNC: 1.34 UIU/ML (ref 0.36–3.74)
UROBILINOGEN UR QL STRIP.AUTO: 0.2 E.U./DL
WBC # BLD AUTO: 10.62 THOUSAND/UL (ref 4.31–10.16)
WBC #/AREA URNS AUTO: ABNORMAL /HPF

## 2020-09-04 PROCEDURE — 86200 CCP ANTIBODY: CPT

## 2020-09-04 PROCEDURE — 86618 LYME DISEASE ANTIBODY: CPT

## 2020-09-04 PROCEDURE — 36415 COLL VENOUS BLD VENIPUNCTURE: CPT

## 2020-09-04 PROCEDURE — 81001 URINALYSIS AUTO W/SCOPE: CPT | Performed by: INTERNAL MEDICINE

## 2020-09-04 PROCEDURE — 86666 EHRLICHIA ANTIBODY: CPT

## 2020-09-04 PROCEDURE — 99214 OFFICE O/P EST MOD 30 MIN: CPT | Performed by: INTERNAL MEDICINE

## 2020-09-04 PROCEDURE — 86753 PROTOZOA ANTIBODY NOS: CPT

## 2020-09-04 PROCEDURE — 85025 COMPLETE CBC W/AUTO DIFF WBC: CPT

## 2020-09-04 PROCEDURE — 86141 C-REACTIVE PROTEIN HS: CPT

## 2020-09-04 PROCEDURE — 86038 ANTINUCLEAR ANTIBODIES: CPT

## 2020-09-04 PROCEDURE — 85652 RBC SED RATE AUTOMATED: CPT

## 2020-09-04 PROCEDURE — 84443 ASSAY THYROID STIM HORMONE: CPT

## 2020-09-04 PROCEDURE — 80053 COMPREHEN METABOLIC PANEL: CPT

## 2020-09-04 PROCEDURE — 86430 RHEUMATOID FACTOR TEST QUAL: CPT

## 2020-09-04 PROCEDURE — 3725F SCREEN DEPRESSION PERFORMED: CPT | Performed by: INTERNAL MEDICINE

## 2020-09-04 RX ORDER — DOXYCYCLINE HYCLATE 100 MG/1
100 CAPSULE ORAL EVERY 12 HOURS SCHEDULED
Qty: 20 CAPSULE | Refills: 3 | Status: SHIPPED | OUTPATIENT
Start: 2020-09-04 | End: 2020-09-14

## 2020-09-04 NOTE — TELEPHONE ENCOUNTER
LVM for patient to call us back if interested on being put on our wait list as a NP  Patient called stating she would like an appointment with Dr Jaguar Buchanan

## 2020-09-04 NOTE — TELEPHONE ENCOUNTER
Tried to reach patient again after she called leaving a voice message  Phone went starlight to voice mail

## 2020-09-04 NOTE — PATIENT INSTRUCTIONS
A solitary purpuric lesion  Treatment with doxycycline with possible diagnosis of tick-borne disease   Vasculitis workup   Dermatology consultation   See me back here again in several weeks

## 2020-09-04 NOTE — PROGRESS NOTES
Assessment/Plan:       Diagnoses and all orders for this visit:    Purpura ([facility] [address] )  -     CBC and differential; Future  -     Comprehensive metabolic panel; Future  -     TSH, 3rd generation with Free T4 reflex; Future  -     UA (URINE) with reflex to Scope  -     Lyme Antibody Profile with reflex to WB; Future  -     Ehrlichia antibody panel; Future  -     Babesia microti antibody, IgG & Igm; Future  -     Cyclic citrul peptide antibody, IgG; Future  -     CHERYL Screen w/ Reflex to Titer/Pattern; Future  -     High sensitivity CRP; Future  -     RF Screen w/ Reflex to Titer; Future  -     Sedimentation rate, automated; Future  -     doxycycline hyclate (VIBRAMYCIN) 100 mg capsule; Take 1 capsule (100 mg total) by mouth every 12 (twelve) hours for 10 days  -     Ambulatory referral to Dermatology; Future                Subjective:      Patient ID: Kalina Cruz is a 41 y o  female  Skin lesion   Localized to the right anteromedial shin  Began about 1 month ago up was appear to be a small vesicle or dot  It has progressed to the point where it is now a 1 centimeter purpuric lesion which is tender to palpation  Please refer to the illustration in the media section of the chart  No other symptoms  No joint swelling, no fever, no chills, no sweats, no appetite loss, and no weight loss  No history of insect bite    it is a solitary lesion  No where else in the body is dry anything similar  No family members have anything similar      The following portions of the patient's history were reviewed and updated as appropriate:   She has a past medical history of Benign neoplasm of skin, Brain cancer ([facility] [address] ), and Hyperparathyroidism, primary ([facility] [address] )  ,  does not have any pertinent problems on file  ,   has a past surgical history that includes Brain surgery;  section; and Foot surgery  ,  family history includes Alcohol abuse in her father; COPD in her mother; Cirrhosis in her father; Colon cancer in her maternal grandmother; Diabetes in her father and mother; Heart disease in her father and mother; Hyperlipidemia in her mother; Hypertension in her father and mother; Ovarian cancer in her maternal grandmother; Stroke in her mother; Thyroid disease in her sister  ,   reports that she quit smoking about 19 years ago  Her smoking use included cigarettes  She has a 7 50 pack-year smoking history  She has never used smokeless tobacco  She reports current alcohol use  She reports that she does not use drugs  ,  is allergic to pollen extract     Current Outpatient Medications   Medication Sig Dispense Refill    TRI-LO-MACIE 0 18/0 215/0 25 MG-25 MCG per tablet Take 1 tablet by mouth daily  4    valACYclovir (VALTREX) 500 mg tablet TAKE 1 TABLET BY MOUTH TWICE A DAY 60 tablet 11    doxycycline hyclate (VIBRAMYCIN) 100 mg capsule Take 1 capsule (100 mg total) by mouth every 12 (twelve) hours for 10 days 20 capsule 3     No current facility-administered medications for this visit  Review of Systems   Skin: Positive for rash  All other systems reviewed and are negative  Objective:  Vitals:    09/04/20 0926   BP: 132/100   Pulse: 77   Temp: 99 °F (37 2 °C)   SpO2: 98%      Physical Exam  Constitutional:       Appearance: Normal appearance  Cardiovascular:      Rate and Rhythm: Normal rate  Pulmonary:      Effort: Pulmonary effort is normal    Skin:     Findings: Lesion present  Comments: Skin lesion as described in the history; refer to the media photograph   Neurological:      General: No focal deficit present  Mental Status: She is alert  Patient Instructions    A solitary purpuric lesion  Treatment with doxycycline with possible diagnosis of tick-borne disease   Vasculitis workup   Dermatology consultation   See me back here again in several weeks

## 2020-09-05 LAB — B BURGDOR IGG+IGM SER-ACNC: <0.91 ISR (ref 0–0.9)

## 2020-09-06 ENCOUNTER — HOSPITAL ENCOUNTER (EMERGENCY)
Facility: HOSPITAL | Age: 40
Discharge: HOME | End: 2020-09-06
Attending: EMERGENCY MEDICINE
Payer: COMMERCIAL

## 2020-09-06 VITALS
OXYGEN SATURATION: 93 % | HEART RATE: 110 BPM | TEMPERATURE: 98.2 F | BODY MASS INDEX: 29.41 KG/M2 | WEIGHT: 166.01 LBS | RESPIRATION RATE: 18 BRPM | SYSTOLIC BLOOD PRESSURE: 134 MMHG | DIASTOLIC BLOOD PRESSURE: 87 MMHG

## 2020-09-06 DIAGNOSIS — D64.9 ANEMIA: ICD-10-CM

## 2020-09-06 DIAGNOSIS — N93.8 DYSFUNCTIONAL UTERINE BLEEDING: Primary | ICD-10-CM

## 2020-09-06 LAB
BACTERIA UR QL AUTO: ABNORMAL /HPF
BASOPHILS # BLD AUTO: 0.09 THOUSANDS/ΜL (ref 0–0.1)
BASOPHILS NFR BLD AUTO: 1 % (ref 0–1)
BILIRUB UR QL STRIP: NEGATIVE
CCP IGA+IGG SERPL IA-ACNC: 4 UNITS (ref 0–19)
CLARITY UR: ABNORMAL
COLOR UR: YELLOW
EOSINOPHIL # BLD AUTO: 0.43 THOUSAND/ΜL (ref 0–0.61)
EOSINOPHIL NFR BLD AUTO: 4 % (ref 0–6)
ERYTHROCYTE [DISTWIDTH] IN BLOOD BY AUTOMATED COUNT: 13.9 % (ref 11.6–15.1)
EXT PREG TEST URINE: NEGATIVE
EXT. CONTROL ED NAV: NORMAL
GLUCOSE UR STRIP-MCNC: NEGATIVE MG/DL
HCT VFR BLD AUTO: 34.6 % (ref 34.8–46.1)
HGB BLD-MCNC: 10.8 G/DL (ref 11.5–15.4)
HGB UR QL STRIP.AUTO: ABNORMAL
IMM GRANULOCYTES # BLD AUTO: 0.02 THOUSAND/UL (ref 0–0.2)
IMM GRANULOCYTES NFR BLD AUTO: 0 % (ref 0–2)
KETONES UR STRIP-MCNC: NEGATIVE MG/DL
LEUKOCYTE ESTERASE UR QL STRIP: NEGATIVE
LYMPHOCYTES # BLD AUTO: 2.99 THOUSANDS/ΜL (ref 0.6–4.47)
LYMPHOCYTES NFR BLD AUTO: 30 % (ref 14–44)
MCH RBC QN AUTO: 28.7 PG (ref 26.8–34.3)
MCHC RBC AUTO-ENTMCNC: 31.2 G/DL (ref 31.4–37.4)
MCV RBC AUTO: 92 FL (ref 82–98)
MONOCYTES # BLD AUTO: 0.89 THOUSAND/ΜL (ref 0.17–1.22)
MONOCYTES NFR BLD AUTO: 9 % (ref 4–12)
NEUTROPHILS # BLD AUTO: 5.64 THOUSANDS/ΜL (ref 1.85–7.62)
NEUTS SEG NFR BLD AUTO: 56 % (ref 43–75)
NITRITE UR QL STRIP: NEGATIVE
NON-SQ EPI CELLS URNS QL MICRO: ABNORMAL /HPF
NRBC BLD AUTO-RTO: 0 /100 WBCS
PH UR STRIP.AUTO: 5.5 [PH]
PLATELET # BLD AUTO: 375 THOUSANDS/UL (ref 149–390)
PMV BLD AUTO: 10.1 FL (ref 8.9–12.7)
PROT UR STRIP-MCNC: NEGATIVE MG/DL
RBC # BLD AUTO: 3.76 MILLION/UL (ref 3.81–5.12)
RBC #/AREA URNS AUTO: ABNORMAL /HPF
SP GR UR STRIP.AUTO: >=1.03 (ref 1–1.03)
UROBILINOGEN UR QL STRIP.AUTO: 0.2 E.U./DL
WBC # BLD AUTO: 10.06 THOUSAND/UL (ref 4.31–10.16)
WBC #/AREA URNS AUTO: ABNORMAL /HPF

## 2020-09-06 PROCEDURE — 87591 N.GONORRHOEAE DNA AMP PROB: CPT | Performed by: EMERGENCY MEDICINE

## 2020-09-06 PROCEDURE — 36415 COLL VENOUS BLD VENIPUNCTURE: CPT | Performed by: EMERGENCY MEDICINE

## 2020-09-06 PROCEDURE — 85025 COMPLETE CBC W/AUTO DIFF WBC: CPT | Performed by: EMERGENCY MEDICINE

## 2020-09-06 PROCEDURE — 99284 EMERGENCY DEPT VISIT MOD MDM: CPT | Performed by: EMERGENCY MEDICINE

## 2020-09-06 PROCEDURE — 81025 URINE PREGNANCY TEST: CPT | Performed by: EMERGENCY MEDICINE

## 2020-09-06 PROCEDURE — 99284 EMERGENCY DEPT VISIT MOD MDM: CPT

## 2020-09-06 PROCEDURE — 81001 URINALYSIS AUTO W/SCOPE: CPT | Performed by: EMERGENCY MEDICINE

## 2020-09-06 PROCEDURE — 87491 CHLMYD TRACH DNA AMP PROBE: CPT | Performed by: EMERGENCY MEDICINE

## 2020-09-06 RX ORDER — NAPROXEN 500 MG/1
500 TABLET ORAL 2 TIMES DAILY WITH MEALS
Qty: 14 TABLET | Refills: 0 | Status: SHIPPED | OUTPATIENT
Start: 2020-09-06 | End: 2021-08-18

## 2020-09-06 RX ORDER — FERROUS SULFATE 325(65) MG
325 TABLET ORAL DAILY
Qty: 20 TABLET | Refills: 0 | Status: SHIPPED | OUTPATIENT
Start: 2020-09-06 | End: 2021-08-18

## 2020-09-06 NOTE — ED PROVIDER NOTES
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, presents to the emergency department for vaginal bleeding and passage of a large blood clot that started less than 1 hour ago  Patient reports she just finished her menses yesterday and states her normal menstrual cycle lasts about 3-4 days and is usually very light  She just started her new birth control pack today and while out to dinner this evening, she felt leakage of fluid from the vagina so she went to the bathroom and passed a very large blood clot per the vagina  She has had heavy vaginal bleeding since  Patient reports on her way to the ED she was having some lightheadedness and bilateral hand tingling sensation  She admits to being anxious and states this is chronic for her  She denies any fever, chills, headache, vertigo, cough, URI symptoms, skin rash or pallor, palpitations, dyspnea, chest pain or tightness, abdominal pain or cramping, pelvic pain, nausea, vomiting, change in bowel habits, dysuria, change in urinary frequency, hematuria prior to the vaginal bleeding, vaginal discharge other than blood, vaginal foul odor, extremity weakness or other focal neurologic deficits  Patient does have an OBGYN to follow-up with  Denies being on any blood thinners  History provided by:  Patient   used: No    Vaginal Bleeding   Associated symptoms: no abdominal pain, no back pain, no dizziness, no dysuria, no fever, no nausea and no vaginal discharge        Prior to Admission Medications   Prescriptions Last Dose Informant Patient Reported? Taking?    TRI-LO-MACIE 0 18/0 215/0 25 MG-25 MCG per tablet  Self Yes No   Sig: Take 1 tablet by mouth daily   doxycycline hyclate (VIBRAMYCIN) 100 mg capsule   No No   Sig: Take 1 capsule (100 mg total) by mouth every 12 (twelve) hours for 10 days   valACYclovir (VALTREX) 500 mg tablet   No No   Sig: TAKE 1 TABLET BY MOUTH TWICE A DAY      Facility-Administered Medications: None       Past Medical History:   Diagnosis Date    Benign neoplasm of skin     [facility]     [facility]     last assessed 2015       Past Surgical History:   Procedure Laterality Date   [facility]      Neurosurgery for brain surgery;  last assessed [id number]     SECTION      FOOT SURGERY         Family History   Problem Relation Age of Onset    Diabetes Mother     Heart disease Mother     COPD Mother     Hypertension Mother     Hyperlipidemia Mother     Stroke Mother     Diabetes Father     Heart disease Father     Alcohol abuse Father     Cirrhosis Father     Hypertension Father     Colon cancer Maternal Grandmother     Ovarian cancer Maternal Grandmother     Thyroid disease Sister      I have reviewed and agree with the history as documented  E-Cigarette/Vaping    E-Cigarette Use Never User      E-Cigarette/Vaping Substances    Nicotine No     THC No     CBD No     Flavoring No     Other No     Unknown No      Social History     Tobacco Use    Smoking status: Former Smoker     Packs/day: 0 50     Years: 15 00     Pack years: 7 50     Types: Cigarettes     Last attempt to quit:      Years since quittin 6    Smokeless tobacco: Never Used    Tobacco comment: never a smoker, per [employer]   Substance Use Topics    Alcohol use: Yes     Comment: rarely, per [employer]    Drug use: No       Review of Systems   Constitutional: Negative for chills and fever  HENT: Negative for congestion, ear pain, rhinorrhea and sore throat  Respiratory: Negative for cough, chest tightness, shortness of breath and wheezing  Cardiovascular: Negative for chest pain and palpitations  Gastrointestinal: Negative for abdominal distention, abdominal pain, blood in stool, constipation, diarrhea, nausea and vomiting  Genitourinary: Positive for vaginal bleeding  Negative for dysuria, flank pain, frequency, genital sores, hematuria, pelvic pain, vaginal discharge and vaginal pain  Musculoskeletal: Negative for back pain and neck pain  Skin: Negative for color change, pallor and rash  Allergic/Immunologic: Negative for immunocompromised state  Neurological: Positive for numbness  Negative for dizziness, syncope, facial asymmetry, speech difficulty, weakness, light-headedness and headaches  +Bilateral hand tingling  Hematological: Negative for adenopathy  Does not bruise/bleed easily  Psychiatric/Behavioral: Negative for confusion and decreased concentration  All other systems reviewed and are negative  Physical Exam  Physical Exam  Vitals signs and nursing note reviewed  Exam conducted with a chaperone present Phani Musa ED nurse present during pelvic exam)  Constitutional:       General: She is not in acute distress  Appearance: Normal appearance  She is well-developed  She is not ill-appearing, toxic-appearing or diaphoretic  HENT:      Head: Normocephalic and atraumatic  Right Ear: External ear normal       Left Ear: External ear normal       Mouth/Throat:      Comments: Orpharyngeal exam deferred at this time due to risk of exposure to COVID-19 during current pandemic  Patient has no oropharyngeal complaints  Eyes:      Extraocular Movements: Extraocular movements intact  Pupils: Pupils are equal, round, and reactive to light  Neck:      Musculoskeletal: Normal range of motion and neck supple  Vascular: No JVD  Cardiovascular:      Rate and Rhythm: Normal rate and regular rhythm  Heart sounds: Normal heart sounds  No murmur  No friction rub  No gallop      Pulmonary:      Effort: Pulmonary effort is normal  No respiratory distress  Breath sounds: Normal breath sounds  No wheezing or rales  Abdominal:      General: Bowel sounds are normal  There is no distension  Palpations: Abdomen is soft  Tenderness: There is no abdominal tenderness  There is no guarding or rebound  Genitourinary:     Comments: External genitalia appears normal   On insertion of the speculum, there was a small amount of blood in the posterior vaginal vault  There was a small blood clot coming from the cervical os which I was able to partially remove  No active hemorrhage or bleeding  No abnormal discharge or foul odor  No cervical motion or adnexal tenderness  Musculoskeletal: Normal range of motion  General: No swelling or tenderness  Lymphadenopathy:      Cervical: No cervical adenopathy  Skin:     General: Skin is warm and dry  Coloration: Skin is not pale  Findings: No erythema or rash  Neurological:      General: No focal deficit present  Mental Status: She is alert and oriented to person, place, and time  Sensory: No sensory deficit  Motor: No weakness  Psychiatric:         Behavior: Behavior normal       Comments: Patient appears significantly anxious           Vital Signs  ED Triage Vitals [09/06/20 1854]   Temperature Pulse Respirations Blood Pressure SpO2   98 2 °F (36 8 °C) (!) 110 18 134/87 93 %      Temp Source Heart Rate Source Patient Position - Orthostatic VS BP Location FiO2 (%)   Oral Monitor -- Right arm --      Pain Score       --         Vitals:    09/06/20 1854   BP: 134/87   BP Location: Right arm   Pulse: (!) 110   Resp: 18   Temp: 98 2 °F (36 8 °C)   TempSrc: Oral   SpO2: 93%   Weight: 75 3 kg (166 lb 0 1 oz)       Visual Acuity      ED Medications  Medications - No data to display    Diagnostic Studies  Results Reviewed     Procedure Component Value Units Date/Time    CBC and differential [[id number]]  (Abnormal) Collected:  09/06/20 1957    Lab Status:  Final result Specimen:  Blood from Arm, Right Updated:  09/06/20 2010     WBC 10 06 Thousand/uL      RBC 3 76 Million/uL      Hemoglobin 10 8 g/dL      Hematocrit 34 6 %      MCV 92 fL      MCH 28 7 pg      MCHC 31 2 g/dL      RDW 13 9 %      MPV 10 1 fL      Platelets 502 Thousands/uL      nRBC 0 /100 WBCs      Neutrophils Relative 56 %      Immat GRANS % 0 %      Lymphocytes Relative 30 %      Monocytes Relative 9 %      Eosinophils Relative 4 %      Basophils Relative 1 %      Neutrophils Absolute 5 64 Thousands/µL      Immature Grans Absolute 0 02 Thousand/uL      Lymphocytes Absolute 2 99 Thousands/µL      Monocytes Absolute 0 89 Thousand/µL      Eosinophils Absolute 0 43 Thousand/µL      Basophils Absolute 0 09 Thousands/µL     Urine Microscopic [[id number]]  (Abnormal) Collected:  09/06/20 1917    Lab Status:  Final result Specimen:  Urine, Clean Catch Updated:  09/06/20 2000     RBC, UA 30-50 /hpf      WBC, UA None Seen /hpf      Epithelial Cells Occasional /hpf      Bacteria, UA None Seen /hpf     UA (URINE) with reflex to Scope [[id number]]  (Abnormal) Collected:  09/06/20 1917    Lab Status:  Final result Specimen:  Urine, Clean Catch Updated:  09/06/20 1959     Color, UA Yellow     Clarity, UA Cloudy     Specific Gravity, UA >=1 030     pH, UA 5 5     Leukocytes, UA Negative     Nitrite, UA Negative     Protein, UA Negative mg/dl      Glucose, UA Negative mg/dl      Ketones, UA Negative mg/dl      Urobilinogen, UA 0 2 E U /dl      Bilirubin, UA Negative     Blood, UA Large    POCT pregnancy, urine [[id number]]  (Normal) Resulted:  09/06/20 1922    Lab Status:  Final result Updated:  09/06/20 1922     EXT PREG TEST UR (Ref: Negative) negative     Control valid    Chlamydia/GC amplified DNA by PCR [[id number]] Collected:  09/06/20 1917    Lab Status:   In process Specimen:  Urine, Other Updated:  09/06/20 1921                 No orders to display              Procedures  Procedures ED Course  ED Course as of Sep 06 2117   Matt Alas Sep 06, 2020   2026 Slight drop from 2 days ago (Hgb 12 4 at that time)  Will start patient on naprosyn and iron supplements and have her f/u closely with Ob/Gyn  Hemoglobin(!): 10 8   2117 Advised patient to continue taking her birth control as prescribed  Will start her on daily iron supplement and naproxen  Recommended she call her OBGYN on Tuesday for close follow-up  Discussed ED return parameters including signs and symptoms of worsening anemia  MDM  Number of Diagnoses or Management Options  Diagnosis management comments: 47-year-old female presents with vaginal bleeding with passage of large blood clot starting tonight, after recently finishing her menstrual cycle  Most likely this is continuation of her menses or possibly hormonal imbalance causing dysfunctional uterine bleeding  Will check for pregnancy with urine test, urinalysis, urine gonorrhea chlamydia and will also obtain CBC to check hemoglobin  Will perform pelvic exam to remove any visible clots  Advised her to follow up closely with her OBGYN on Tuesday  Amount and/or Complexity of Data Reviewed  Clinical lab tests: ordered and reviewed          Disposition  Final diagnoses:   Dysfunctional uterine bleeding   Anemia     Time reflects when diagnosis was documented in both MDM as applicable and the Disposition within this note     Time User Action Codes Description Comment    9/6/2020  9:15 PM Everlene Sprain E Add [N93 8] Dysfunctional uterine bleeding     9/6/2020  9:15 PM Everlene Sprain E Add [D64 9] Anemia       ED Disposition     ED Disposition Condition Date/Time Comment    Discharge Stable Sun Sep 6, 2020  9:15 PM Katie Chaparro discharge to home/self care              Follow-up Information     Follow up With Specialties Details Why Contact Info Additional Information    OBGYN  Schedule an appointment as soon as possible for a visit [address] [facility] Emergency Department Emergency Medicine Go to  If symptoms worsen [address] [place] [address] Thomas [id number] [phone] [place] [facility], [address], [place], [address]          Patient's Medications   Discharge Prescriptions    FERROUS SULFATE 325 (65 FE) MG TABLET    Take 1 tablet (325 mg total) by mouth daily       Start Date: 9/6/2020  End Date: --       Order Dose: 325 mg       Quantity: 20 tablet    Refills: 0    NAPROXEN (NAPROSYN) 500 MG TABLET    Take 1 tablet (500 mg total) by mouth 2 (two) times a day with meals       Start Date: 9/6/2020  End Date: --       Order Dose: 500 mg       Quantity: 14 tablet    Refills: 0     No discharge procedures on file      PDMP Review     None          ED Provider  Electronically Signed by           Niya Loja DO  09/06/20 5452

## 2020-09-07 LAB
C TRACH DNA SPEC QL NAA+PROBE: NEGATIVE
N GONORRHOEA DNA SPEC QL NAA+PROBE: NEGATIVE

## 2020-09-07 NOTE — DISCHARGE INSTRUCTIONS
Dysfunctional Uterine Bleeding   WHAT YOU NEED TO KNOW:   Dysfunctional uterine bleeding (DUB) is abnormal uterine bleeding that is caused by a problem with your hormones  You may have bleeding from your uterus at times other than your normal monthly period  Your monthly periods may last longer or shorter, and bleeding may be heavier or lighter than usual    DISCHARGE INSTRUCTIONS:   Medicines:   · Hormones  help decrease bleeding by making your monthly periods more regular  Sometimes this medicine may be given as birth control pills  · NSAIDs  help decrease swelling, pain, and fever  This medicine is available with or without a doctor's order  NSAIDs can cause stomach bleeding or kidney problems in certain people  If you take blood thinner medicine, always ask your healthcare provider if NSAIDs are safe for you  Always read the medicine label and follow directions  · Iron supplements  may be given if your blood iron level decreases because of heavy bleeding  Iron may make you constipated  Ask your healthcare provider for ways to prevent or treat constipation  Iron may also make your bowel movements turn dark or black  · Take your medicine as directed  Contact your healthcare provider if you think your medicine is not helping or if you have side effects  Tell him or her if you are allergic to any medicine  Keep a list of the medicines, vitamins, and herbs you take  Include the amounts, and when and why you take them  Bring the list or the pill bottles to follow-up visits  Carry your medicine list with you in case of an emergency  Follow up with your healthcare provider as directed:  Write down your questions so you remember to ask them during your visits  Self-care:   · Apply heat  on your lower abdomen for 20 to 30 minutes every 2 hours for as many days as directed  Heat helps decrease pain and muscle spasms  · Include foods high in iron  if needed   Examples of foods high in iron are leafy green vegetables, beef, pork, liver, eggs, and whole-grain breads and cereals  · Keep a diary of your menstrual cycles  Keep track of the number of tampons or pads you use each day  · Talk to your healthcare provider before you start a weight loss program   You may need to wait until the abnormal bleeding has stopped before you try to lose weight  The amount of iron in your blood should be normal before you lose weight  Contact your healthcare provider if:   · You need to change your sanitary pad or tampon more than once an hour  · Your medicine causes nausea, vomiting, or diarrhea  · You have questions or concerns about your condition or care  Seek care immediately or call 911 if:   · You continue to bleed heavily, or you feel faint  © 2017 [employer]  Information is for End User's use only and may not be sold, redistributed or otherwise used for commercial purposes  All illustrations and images included in CareNotes® are the copyrighted property of A D A M [employer] Inc  or [employer]  The above information is an  only  It is not intended as medical advice for individual conditions or treatments  Talk to your doctor, nurse or pharmacist before following any medical regimen to see if it is safe and effective for you

## 2020-09-08 LAB
A PHAGOCYTOPH IGG TITR SER IF: NEGATIVE {TITER}
A PHAGOCYTOPH IGM TITR SER IF: NEGATIVE {TITER}
B MICROTI IGG TITR SER: NORMAL {TITER}
B MICROTI IGM TITR SER: NORMAL {TITER}
E CHAFFEENSIS IGG TITR SER IF: NEGATIVE {TITER}
E CHAFFEENSIS IGM TITR SER IF: NEGATIVE {TITER}
RHEUMATOID FACT SER QL LA: NEGATIVE

## 2020-09-09 LAB — RYE IGE QN: NEGATIVE

## 2020-09-15 ENCOUNTER — CONSULT (OUTPATIENT)
Dept: DERMATOLOGY | Facility: CLINIC | Age: 40
End: 2020-09-15
Payer: COMMERCIAL

## 2020-09-15 VITALS — TEMPERATURE: 98.6 F

## 2020-09-15 DIAGNOSIS — L08.9 SUPERFICIAL SKIN INFECTION: Primary | ICD-10-CM

## 2020-09-15 PROCEDURE — 99202 OFFICE O/P NEW SF 15 MIN: CPT | Performed by: DERMATOLOGY

## 2020-09-15 RX ORDER — NORETHINDRONE ACETATE AND ETHINYL ESTRADIOL 1MG-20(21)
1 KIT ORAL DAILY
COMMUNITY
Start: 2020-09-11 | End: 2022-02-15 | Stop reason: ALTCHOICE

## 2020-09-15 RX ORDER — DOXYCYCLINE 100 MG/1
100 TABLET ORAL 2 TIMES DAILY
Qty: 20 TABLET | Refills: 0 | Status: SHIPPED | OUTPATIENT
Start: 2020-09-15 | End: 2020-09-25

## 2020-09-15 NOTE — PATIENT INSTRUCTIONS
Assessment and Plan:  Based on a thorough discussion of this condition and the management approach to it (including a comprehensive discussion of the known risks, side effects and potential benefits of treatment), the patient (family) agrees to implement the following specific plan:   Complete another course of doxycycline for 10 days     Minimize salt consumption while healing   Elevate leg as much as possible    Follow up as needed or if not better after 10 day course Dr. Hill at bedside to remove RQ accordion drain. Pt tolerated well, gauze and transparent film applied nad noted will cont to monitor

## 2020-09-15 NOTE — PROGRESS NOTES
[facility] Note     Patient Name: April Torres  Encounter Date: 09/15/2020     Have you been cared for by a [facility] Dermatologist in the last 3 years and, if so, which one? No    · Have you traveled outside of the [address] [place] [address] in the past 3 months or outside of the [facility] area in the last 2 weeks? No     May we call your Preferred Phone number to discuss your specific medical information? Yes     May we leave a detailed message that includes your specific medical information? Yes      Today's Chief Concerns:   Concern #1:  Purpura     Past Medical History:  Have you personally ever had or currently have any of the following? · Skin cancer (such as Melanoma, Basal Cell Carcinoma, Squamous Cell Carcinoma? (If Yes, please provide more detail)- No  · Eczema: No  · Psoriasis: No  · HIV/AIDS: No  · Hepatitis B or C: No  · Tuberculosis: No  · Systemic Immunosuppression such as Diabetes, Biologic or Immunotherapy, Chemotherapy, Organ Transplantation, Bone Marrow Transplantation (If YES, please provide more detail): No  · Radiation Treatment (If YES, please provide more detail): No  · Any other major medical conditions/concerns? 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 Family History:  Have any of your "first degree relatives" (parent, brother, sister, or child) had any of the following       · Skin cancer such as Melanoma or Merkel Cell Carcinoma or Pancreatic Cancer? No  · Eczema, Asthma, Hay Fever or Seasonal Allergies: No  · Psoriasis or Psoriatic Arthritis: No  · Do any other medical conditions seem to run in your family? If Yes, what condition and which relatives?   No    Current Medications:         Current Outpatient Medications:     ferrous sulfate 325 (65 Fe) mg tablet, Take 1 tablet (325 mg total) by mouth daily, Disp: 20 tablet, Rfl: 0    naproxen (NAPROSYN) 500 mg tablet, Take 1 tablet (500 mg total) by mouth 2 (two) times a day with meals, Disp: 14 tablet, Rfl: 0    TRI-LO-MACIE 0 18/0 215/0 25 MG-25 MCG per tablet, Take 1 tablet by mouth daily, Disp: , Rfl: 4    valACYclovir (VALTREX) 500 mg tablet, TAKE 1 TABLET BY MOUTH TWICE A DAY, Disp: 60 tablet, Rfl: 11      Review of Systems:  Have you recently had or currently have any of the following? If YES, what are you doing for the problem? · Fever, chills or unintended weight loss: No  · Sudden loss or change in your vision: No  · Nausea, vomiting or blood in your stool: No  · Painful or swollen joints: No  · Wheezing or cough: No  · Changing mole or non-healing wound: No  · Nosebleeds: No  · Excessive sweating: No  · Easy or prolonged bleeding? No  · Over the last 2 weeks, how often have you been bothered by the following problems? · Taking little interest or pleasure in doing things: 1 - Not at All  · Feeling down, depressed, or hopeless: 1 - Not at All  · Rapid heartbeat with epinephrine:  No    · FEMALES ONLY:    · Are you pregnant or planning to become pregnant? No  · Are you currently or planning to be nursing or breast feeding? No    · Any known allergies? Allergies   Allergen Reactions    Pollen Extract Sneezing and Nasal Congestion     Depending on the season         Physical Exam:     Was a chaperone (Derm Clinical Assistant) present throughout the entire Physical Exam? Yes     Did the Dermatology Team specifically  the patient on the importance of a Full Skin Exam to be sure that nothing is missed clinically?  Yes}  o Did the patient ultimately request or accept a Full Skin Exam?  NO  o Did the patient specifically refuse to have the areas "under-the-bra" examined by the Dermatologist? No  o Did the patient specifically refuse to have the areas "under-the-underwear" examined by the Dermatologist? No    CONSTITUTIONAL:   Vitals:    09/15/20 1153   Temp: 98 6 °F (37 °C) PSYCH: Normal mood and affect  EYES: Normal conjunctiva  ENT: Normal lips and oral mucosa  CARDIOVASCULAR: No edema  RESPIRATORY: Normal respirations    SKIN:  FULL ORGAN SYSTEM EXAM   Face  Neck, ears Normal except as noted below in Assessment   Right lower Leg, bilateral hands Normal except as noted below in Assessment   Left Leg, Foot, Toes NOT EXAMINED        Assessment and Plan by Diagnosis:    History of Present Condition:     Duration:  How long has this been an issue for you?    o  1 month   Location Affected:  Where on the body is this affecting you?    o  right lower leg   Quality:  Is there any bleeding, pain, itch, burning/irritation, or redness associated with the skin lesion? o  itching, deep pain    Severity:  Describe any bleeding, pain, itch, burning/irritation, or redness on a scale of 1 to 10 (with 10 being the worst)  o  7   Timing:  Does this condition seem to be there pretty constantly or do you notice it more at specific times throughout the day?    o  Constant    Context:  Have you ever noticed that this condition seems to be associated with specific activities you do?    o  Unknown   Modifying Factors:    o Anything that seems to make the condition worse? -  Touching  o What have you tried to do to make the condition better?    -  Doxycyline, moisturizer    Associated Signs and Symptoms:  Does this skin lesion seem to be associated with any of the following:  o  Pain          Superficial skin infection     Physical Exam:   (Anatomic Location); (Size and Morphological Description); (Differential Diagnosis):  o Right lower leg; focal crust with surrounding erythema with tenderness to palpation; no discharge, no blister, no pustules, no ulcer        Additional History of Present Condition:  Patient reports spot on right lower leg, that started looking as a mosquito bite, but grew in size, denies any bleeding, oozing, pus, draining, blister or irritation   Patient completed a 10 day course of Doxycyline last dose 09/13/2020   Patient has also been moisturizing the area  Reports spot has been healing well but still painful with pressing on it  Denies other similar lesions  Denies history of GI illnesses  Never had anything like this before  Reports chronic LE swelling       Assessment and Plan:  Based on a thorough discussion of this condition and the management approach to it (including a comprehensive discussion of the known risks, side effects and potential benefits of treatment), the patient (family) agrees to implement the following specific plan:   Hard to say what the primary lesion was  Differential includes an insect bite, furunculosis   I reviewed the UA and blood work on file      Complete another course of doxycycline 100 mg BID for 10 days given the persistent erythema and tenderness    Elevate leg as much as possible    Follow up as needed or if not better after 10 day course      Scribe Attestation    I,:   Giuliana Espinoza MA am acting as a scribe while in the presence of the attending physician :        I,:   Liliana Flaherty MD personally performed the services described in this documentation    as scribed in my presence :

## 2020-10-01 ENCOUNTER — OFFICE VISIT (OUTPATIENT)
Dept: INTERNAL MEDICINE CLINIC | Facility: CLINIC | Age: 40
End: 2020-10-01
Payer: COMMERCIAL

## 2020-10-01 ENCOUNTER — APPOINTMENT (OUTPATIENT)
Dept: LAB | Facility: CLINIC | Age: 40
End: 2020-10-01
Payer: COMMERCIAL

## 2020-10-01 VITALS
DIASTOLIC BLOOD PRESSURE: 100 MMHG | BODY MASS INDEX: 29.8 KG/M2 | HEIGHT: 63 IN | HEART RATE: 92 BPM | TEMPERATURE: 99.1 F | SYSTOLIC BLOOD PRESSURE: 144 MMHG | WEIGHT: 168.2 LBS | OXYGEN SATURATION: 99 %

## 2020-10-01 DIAGNOSIS — R01.1 HEART MURMUR: ICD-10-CM

## 2020-10-01 DIAGNOSIS — Z23 NEED FOR INFLUENZA VACCINATION: ICD-10-CM

## 2020-10-01 DIAGNOSIS — D69.2 PURPURA (HCC): ICD-10-CM

## 2020-10-01 LAB
ALBUMIN SERPL BCP-MCNC: 3.4 G/DL (ref 3.5–5)
ALP SERPL-CCNC: 57 U/L (ref 46–116)
ALT SERPL W P-5'-P-CCNC: 17 U/L (ref 12–78)
ANION GAP SERPL CALCULATED.3IONS-SCNC: 6 MMOL/L (ref 4–13)
AST SERPL W P-5'-P-CCNC: 11 U/L (ref 5–45)
BASOPHILS # BLD AUTO: 0.08 THOUSANDS/ΜL (ref 0–0.1)
BASOPHILS NFR BLD AUTO: 1 % (ref 0–1)
BILIRUB SERPL-MCNC: 0.27 MG/DL (ref 0.2–1)
BUN SERPL-MCNC: 11 MG/DL (ref 5–25)
CALCIUM ALBUM COR SERPL-MCNC: 10.4 MG/DL (ref 8.3–10.1)
CALCIUM SERPL-MCNC: 9.9 MG/DL (ref 8.3–10.1)
CHLORIDE SERPL-SCNC: 107 MMOL/L (ref 100–108)
CO2 SERPL-SCNC: 28 MMOL/L (ref 21–32)
CREAT SERPL-MCNC: 0.77 MG/DL (ref 0.6–1.3)
CRP SERPL HS-MCNC: 6.57 MG/L
EOSINOPHIL # BLD AUTO: 0.28 THOUSAND/ΜL (ref 0–0.61)
EOSINOPHIL NFR BLD AUTO: 3 % (ref 0–6)
ERYTHROCYTE [DISTWIDTH] IN BLOOD BY AUTOMATED COUNT: 14 % (ref 11.6–15.1)
ERYTHROCYTE [SEDIMENTATION RATE] IN BLOOD: 34 MM/HOUR (ref 0–19)
GFR SERPL CREATININE-BSD FRML MDRD: 96 ML/MIN/1.73SQ M
GLUCOSE SERPL-MCNC: 102 MG/DL (ref 65–140)
HCT VFR BLD AUTO: 38.5 % (ref 34.8–46.1)
HGB BLD-MCNC: 12.3 G/DL (ref 11.5–15.4)
IMM GRANULOCYTES # BLD AUTO: 0.04 THOUSAND/UL (ref 0–0.2)
IMM GRANULOCYTES NFR BLD AUTO: 1 % (ref 0–2)
LYMPHOCYTES # BLD AUTO: 2.46 THOUSANDS/ΜL (ref 0.6–4.47)
LYMPHOCYTES NFR BLD AUTO: 28 % (ref 14–44)
MCH RBC QN AUTO: 28.9 PG (ref 26.8–34.3)
MCHC RBC AUTO-ENTMCNC: 31.9 G/DL (ref 31.4–37.4)
MCV RBC AUTO: 90 FL (ref 82–98)
MONOCYTES # BLD AUTO: 0.61 THOUSAND/ΜL (ref 0.17–1.22)
MONOCYTES NFR BLD AUTO: 7 % (ref 4–12)
NEUTROPHILS # BLD AUTO: 5.39 THOUSANDS/ΜL (ref 1.85–7.62)
NEUTS SEG NFR BLD AUTO: 60 % (ref 43–75)
NRBC BLD AUTO-RTO: 0 /100 WBCS
PLATELET # BLD AUTO: 405 THOUSANDS/UL (ref 149–390)
PMV BLD AUTO: 10.6 FL (ref 8.9–12.7)
POTASSIUM SERPL-SCNC: 4.1 MMOL/L (ref 3.5–5.3)
PROT SERPL-MCNC: 7.2 G/DL (ref 6.4–8.2)
RBC # BLD AUTO: 4.26 MILLION/UL (ref 3.81–5.12)
RHEUMATOID FACT SER QL LA: NEGATIVE
SODIUM SERPL-SCNC: 141 MMOL/L (ref 136–145)
WBC # BLD AUTO: 8.86 THOUSAND/UL (ref 4.31–10.16)

## 2020-10-01 PROCEDURE — 36415 COLL VENOUS BLD VENIPUNCTURE: CPT

## 2020-10-01 PROCEDURE — 99213 OFFICE O/P EST LOW 20 MIN: CPT | Performed by: INTERNAL MEDICINE

## 2020-10-01 PROCEDURE — 80053 COMPREHEN METABOLIC PANEL: CPT

## 2020-10-01 PROCEDURE — 85652 RBC SED RATE AUTOMATED: CPT

## 2020-10-01 PROCEDURE — 85025 COMPLETE CBC W/AUTO DIFF WBC: CPT

## 2020-10-01 PROCEDURE — 86200 CCP ANTIBODY: CPT

## 2020-10-01 PROCEDURE — 86141 C-REACTIVE PROTEIN HS: CPT

## 2020-10-01 PROCEDURE — 86753 PROTOZOA ANTIBODY NOS: CPT

## 2020-10-01 PROCEDURE — 86618 LYME DISEASE ANTIBODY: CPT

## 2020-10-01 PROCEDURE — 86430 RHEUMATOID FACTOR TEST QUAL: CPT

## 2020-10-01 PROCEDURE — 86038 ANTINUCLEAR ANTIBODIES: CPT

## 2020-10-01 PROCEDURE — 86666 EHRLICHIA ANTIBODY: CPT

## 2020-10-02 LAB
B BURGDOR IGG+IGM SER-ACNC: <0.91 ISR (ref 0–0.9)
RYE IGE QN: NEGATIVE

## 2020-10-03 LAB — CCP IGA+IGG SERPL IA-ACNC: 5 UNITS (ref 0–19)

## 2020-10-05 LAB
A PHAGOCYTOPH IGG TITR SER IF: NEGATIVE {TITER}
A PHAGOCYTOPH IGM TITR SER IF: NEGATIVE {TITER}
B MICROTI IGG TITR SER: NORMAL {TITER}
B MICROTI IGM TITR SER: NORMAL {TITER}
E CHAFFEENSIS IGG TITR SER IF: NEGATIVE {TITER}
E CHAFFEENSIS IGM TITR SER IF: NEGATIVE {TITER}

## 2020-10-13 ENCOUNTER — OFFICE VISIT (OUTPATIENT)
Dept: DERMATOLOGY | Facility: CLINIC | Age: 40
End: 2020-10-13
Payer: COMMERCIAL

## 2020-10-13 VITALS — TEMPERATURE: 97.4 F

## 2020-10-13 DIAGNOSIS — L97.911 NONHEALING ULCER OF RIGHT LOWER LEG LIMITED TO BREAKDOWN OF SKIN (HCC): Primary | ICD-10-CM

## 2020-10-13 DIAGNOSIS — L71.9 ROSACEA: ICD-10-CM

## 2020-10-13 DIAGNOSIS — L82.1 SEBORRHEIC KERATOSIS: ICD-10-CM

## 2020-10-13 DIAGNOSIS — D22.9 NEVUS: ICD-10-CM

## 2020-10-13 DIAGNOSIS — L71.9 ROSACEA: Primary | ICD-10-CM

## 2020-10-13 PROCEDURE — 87205 SMEAR GRAM STAIN: CPT | Performed by: DERMATOLOGY

## 2020-10-13 PROCEDURE — 1036F TOBACCO NON-USER: CPT | Performed by: DERMATOLOGY

## 2020-10-13 PROCEDURE — 87070 CULTURE OTHR SPECIMN AEROBIC: CPT | Performed by: DERMATOLOGY

## 2020-10-13 PROCEDURE — 99204 OFFICE O/P NEW MOD 45 MIN: CPT | Performed by: DERMATOLOGY

## 2020-10-13 RX ORDER — METRONIDAZOLE 7.5 MG/G
GEL TOPICAL DAILY
Qty: 45 G | Refills: 3 | Status: SHIPPED | OUTPATIENT
Start: 2020-10-13 | End: 2020-10-13

## 2020-10-16 ENCOUNTER — HOSPITAL ENCOUNTER (OUTPATIENT)
Dept: NON INVASIVE DIAGNOSTICS | Facility: CLINIC | Age: 40
Discharge: HOME | End: 2020-10-16
Payer: COMMERCIAL

## 2020-10-16 ENCOUNTER — TELEPHONE (OUTPATIENT)
Dept: INTERNAL MEDICINE CLINIC | Facility: CLINIC | Age: 40
End: 2020-10-16

## 2020-10-16 DIAGNOSIS — D69.2 PURPURA (HCC): ICD-10-CM

## 2020-10-16 DIAGNOSIS — R01.1 HEART MURMUR: ICD-10-CM

## 2020-10-16 LAB
BACTERIA WND AEROBE CULT: NO GROWTH
GRAM STN SPEC: NORMAL

## 2020-10-16 PROCEDURE — 93306 TTE W/DOPPLER COMPLETE: CPT | Performed by: INTERNAL MEDICINE

## 2020-10-16 PROCEDURE — 93306 TTE W/DOPPLER COMPLETE: CPT

## 2020-10-28 ENCOUNTER — TELEPHONE (OUTPATIENT)
Dept: ADMINISTRATIVE | Facility: OTHER | Age: 40
End: 2020-10-28

## 2020-10-28 ENCOUNTER — OFFICE VISIT (OUTPATIENT)
Dept: INTERNAL MEDICINE CLINIC | Facility: CLINIC | Age: 40
End: 2020-10-28
Payer: COMMERCIAL

## 2020-10-28 VITALS
HEART RATE: 98 BPM | OXYGEN SATURATION: 97 % | BODY MASS INDEX: 29.59 KG/M2 | HEIGHT: 63 IN | WEIGHT: 167 LBS | TEMPERATURE: 98.2 F | SYSTOLIC BLOOD PRESSURE: 120 MMHG | DIASTOLIC BLOOD PRESSURE: 90 MMHG

## 2020-10-28 DIAGNOSIS — Z23 NEED FOR VACCINATION: ICD-10-CM

## 2020-10-28 DIAGNOSIS — L98.491 SKIN ULCER, LIMITED TO BREAKDOWN OF SKIN (HCC): ICD-10-CM

## 2020-10-28 PROBLEM — C71.9 EPENDYMOMA OF BRAIN (HCC): Status: RESOLVED | Noted: 2020-02-05 | Resolved: 2020-10-28

## 2020-10-28 PROBLEM — E21.0 HYPERPARATHYROIDISM, PRIMARY (HCC): Status: RESOLVED | Noted: 2018-03-14 | Resolved: 2020-10-28

## 2020-10-28 PROCEDURE — 99213 OFFICE O/P EST LOW 20 MIN: CPT | Performed by: INTERNAL MEDICINE

## 2020-10-28 PROCEDURE — 90471 IMMUNIZATION ADMIN: CPT | Performed by: INTERNAL MEDICINE

## 2020-10-28 PROCEDURE — 3008F BODY MASS INDEX DOCD: CPT | Performed by: DERMATOLOGY

## 2020-10-28 PROCEDURE — 90686 IIV4 VACC NO PRSV 0.5 ML IM: CPT | Performed by: INTERNAL MEDICINE

## 2020-10-29 ENCOUNTER — OFFICE VISIT (OUTPATIENT)
Dept: DERMATOLOGY | Facility: CLINIC | Age: 40
End: 2020-10-29
Payer: COMMERCIAL

## 2020-10-29 VITALS — TEMPERATURE: 96.6 F

## 2020-10-29 DIAGNOSIS — L97.911 NONHEALING ULCER OF RIGHT LOWER LEG LIMITED TO BREAKDOWN OF SKIN (HCC): Primary | ICD-10-CM

## 2020-10-29 PROCEDURE — 11104 PUNCH BX SKIN SINGLE LESION: CPT | Performed by: DERMATOLOGY

## 2020-10-29 PROCEDURE — 88305 TISSUE EXAM BY PATHOLOGIST: CPT | Performed by: STUDENT IN AN ORGANIZED HEALTH CARE EDUCATION/TRAINING PROGRAM

## 2020-10-29 PROCEDURE — 99212 OFFICE O/P EST SF 10 MIN: CPT | Performed by: DERMATOLOGY

## 2020-11-05 ENCOUNTER — OFFICE VISIT (OUTPATIENT)
Dept: DERMATOLOGY | Facility: CLINIC | Age: 40
End: 2020-11-05
Payer: COMMERCIAL

## 2020-11-05 VITALS — TEMPERATURE: 97.9 F

## 2020-11-05 DIAGNOSIS — L97.919 STASIS LEG ULCER, RIGHT (HCC): Primary | ICD-10-CM

## 2020-11-05 DIAGNOSIS — I83.019 STASIS LEG ULCER, RIGHT (HCC): Primary | ICD-10-CM

## 2020-11-05 PROCEDURE — 99212 OFFICE O/P EST SF 10 MIN: CPT | Performed by: DERMATOLOGY

## 2020-11-05 PROCEDURE — 29580 STRAPPING UNNA BOOT: CPT | Performed by: DERMATOLOGY

## 2020-11-12 ENCOUNTER — OFFICE VISIT (OUTPATIENT)
Dept: DERMATOLOGY | Facility: CLINIC | Age: 40
End: 2020-11-12
Payer: COMMERCIAL

## 2020-11-12 VITALS — TEMPERATURE: 98.6 F

## 2020-11-12 DIAGNOSIS — L97.919 STASIS LEG ULCER, RIGHT (HCC): Primary | ICD-10-CM

## 2020-11-12 DIAGNOSIS — I83.019 STASIS LEG ULCER, RIGHT (HCC): Primary | ICD-10-CM

## 2020-11-12 PROCEDURE — 1036F TOBACCO NON-USER: CPT | Performed by: DERMATOLOGY

## 2020-11-12 PROCEDURE — 29580 STRAPPING UNNA BOOT: CPT | Performed by: DERMATOLOGY

## 2020-11-12 PROCEDURE — 99212 OFFICE O/P EST SF 10 MIN: CPT | Performed by: DERMATOLOGY

## 2020-11-19 ENCOUNTER — OFFICE VISIT (OUTPATIENT)
Dept: DERMATOLOGY | Facility: CLINIC | Age: 40
End: 2020-11-19
Payer: COMMERCIAL

## 2020-11-19 VITALS — TEMPERATURE: 96.2 F

## 2020-11-19 DIAGNOSIS — I83.019 STASIS LEG ULCER, RIGHT (HCC): Primary | ICD-10-CM

## 2020-11-19 DIAGNOSIS — L97.919 STASIS LEG ULCER, RIGHT (HCC): Primary | ICD-10-CM

## 2020-11-19 PROCEDURE — 29580 STRAPPING UNNA BOOT: CPT | Performed by: DERMATOLOGY

## 2020-11-25 ENCOUNTER — OFFICE VISIT (OUTPATIENT)
Dept: DERMATOLOGY | Facility: CLINIC | Age: 40
End: 2020-11-25

## 2020-11-25 VITALS — TEMPERATURE: 98.2 F

## 2020-11-25 DIAGNOSIS — I83.019 STASIS LEG ULCER, RIGHT (HCC): Primary | ICD-10-CM

## 2020-11-25 DIAGNOSIS — L97.919 STASIS LEG ULCER, RIGHT (HCC): Primary | ICD-10-CM

## 2020-11-25 PROCEDURE — RECHECK: Performed by: DERMATOLOGY

## 2020-12-03 ENCOUNTER — OFFICE VISIT (OUTPATIENT)
Dept: DERMATOLOGY | Facility: CLINIC | Age: 40
End: 2020-12-03
Payer: COMMERCIAL

## 2020-12-03 DIAGNOSIS — I83.019 STASIS LEG ULCER, RIGHT (HCC): Primary | ICD-10-CM

## 2020-12-03 DIAGNOSIS — L97.919 STASIS LEG ULCER, RIGHT (HCC): Primary | ICD-10-CM

## 2020-12-03 PROCEDURE — 29580 STRAPPING UNNA BOOT: CPT | Performed by: DERMATOLOGY

## 2020-12-10 ENCOUNTER — CLINICAL SUPPORT (OUTPATIENT)
Dept: DERMATOLOGY | Facility: CLINIC | Age: 40
End: 2020-12-10
Payer: COMMERCIAL

## 2020-12-10 DIAGNOSIS — L97.919 STASIS LEG ULCER, RIGHT (HCC): Primary | ICD-10-CM

## 2020-12-10 DIAGNOSIS — I83.019 STASIS LEG ULCER, RIGHT (HCC): Primary | ICD-10-CM

## 2020-12-10 PROCEDURE — 29580 STRAPPING UNNA BOOT: CPT | Performed by: DERMATOLOGY

## 2020-12-16 ENCOUNTER — OFFICE VISIT (OUTPATIENT)
Dept: DERMATOLOGY | Facility: CLINIC | Age: 40
End: 2020-12-16
Payer: COMMERCIAL

## 2020-12-16 VITALS — TEMPERATURE: 96.6 F

## 2020-12-16 DIAGNOSIS — I83.019 STASIS LEG ULCER, RIGHT (HCC): Primary | ICD-10-CM

## 2020-12-16 DIAGNOSIS — L97.919 STASIS LEG ULCER, RIGHT (HCC): Primary | ICD-10-CM

## 2020-12-16 PROCEDURE — 99212 OFFICE O/P EST SF 10 MIN: CPT | Performed by: DERMATOLOGY

## 2020-12-16 PROCEDURE — 29580 STRAPPING UNNA BOOT: CPT | Performed by: DERMATOLOGY

## 2020-12-16 PROCEDURE — 1036F TOBACCO NON-USER: CPT | Performed by: DERMATOLOGY

## 2020-12-24 ENCOUNTER — OFFICE VISIT (OUTPATIENT)
Dept: DERMATOLOGY | Facility: CLINIC | Age: 40
End: 2020-12-24
Payer: COMMERCIAL

## 2020-12-24 VITALS — TEMPERATURE: 97.2 F

## 2020-12-24 DIAGNOSIS — I83.019 STASIS LEG ULCER, RIGHT (HCC): Primary | ICD-10-CM

## 2020-12-24 DIAGNOSIS — L97.919 STASIS LEG ULCER, RIGHT (HCC): Primary | ICD-10-CM

## 2020-12-24 PROCEDURE — 29580 STRAPPING UNNA BOOT: CPT | Performed by: DERMATOLOGY

## 2020-12-31 ENCOUNTER — OFFICE VISIT (OUTPATIENT)
Dept: DERMATOLOGY | Facility: CLINIC | Age: 40
End: 2020-12-31
Payer: COMMERCIAL

## 2020-12-31 VITALS — TEMPERATURE: 96.7 F

## 2020-12-31 DIAGNOSIS — L97.919 STASIS LEG ULCER, RIGHT (HCC): Primary | ICD-10-CM

## 2020-12-31 DIAGNOSIS — I83.019 STASIS LEG ULCER, RIGHT (HCC): Primary | ICD-10-CM

## 2020-12-31 PROCEDURE — 29580 STRAPPING UNNA BOOT: CPT | Performed by: DERMATOLOGY

## 2021-01-07 ENCOUNTER — OFFICE VISIT (OUTPATIENT)
Dept: DERMATOLOGY | Facility: CLINIC | Age: 41
End: 2021-01-07
Payer: COMMERCIAL

## 2021-01-07 VITALS — TEMPERATURE: 96.7 F

## 2021-01-07 DIAGNOSIS — L97.919 STASIS LEG ULCER, RIGHT (HCC): Primary | ICD-10-CM

## 2021-01-07 DIAGNOSIS — I83.019 STASIS LEG ULCER, RIGHT (HCC): Primary | ICD-10-CM

## 2021-01-07 PROCEDURE — 29580 STRAPPING UNNA BOOT: CPT | Performed by: DERMATOLOGY

## 2021-01-07 PROCEDURE — 1036F TOBACCO NON-USER: CPT | Performed by: DERMATOLOGY

## 2021-01-07 PROCEDURE — 99024 POSTOP FOLLOW-UP VISIT: CPT | Performed by: DERMATOLOGY

## 2021-01-14 ENCOUNTER — OFFICE VISIT (OUTPATIENT)
Dept: DERMATOLOGY | Facility: CLINIC | Age: 41
End: 2021-01-14
Payer: COMMERCIAL

## 2021-01-14 VITALS — TEMPERATURE: 96.1 F

## 2021-01-14 DIAGNOSIS — L97.919 STASIS LEG ULCER, RIGHT (HCC): Primary | ICD-10-CM

## 2021-01-14 DIAGNOSIS — I83.019 STASIS LEG ULCER, RIGHT (HCC): Primary | ICD-10-CM

## 2021-01-14 PROCEDURE — 29580 STRAPPING UNNA BOOT: CPT | Performed by: DERMATOLOGY

## 2021-01-14 NOTE — PATIENT INSTRUCTIONS
Area cleansed with saline DuoDerm Unna boot applied and Coban dressing will plan on having patient obtain a support stockings next week

## 2021-01-14 NOTE — PROGRESS NOTES
[facility] DERMATOLOGY  [address] [place] [id number]  [phone]  [phone]     MRN: [id number] : 1980  Encounter: [id number]  Patient Information: Oj Hicks    Subjective:     35Year old female presents secondary to her stasis ulcer right lower leg     Objective:   Temp (!) 96 1 °F (35 6 °C)     Physical Exam:    General Appearance:    Alert, cooperative, no distress   Skin:   Ulceration appears to be punctate not sure it is completely healed at this point occult to really see there is still an area where it is not completely closed     Assessment:     1  Stasis leg ulcer, right ([address] )           Plan:   Area cleansed with saline DuoDerm Unna boot applied and Coban dressing will plan on having patient obtain a support stockings next week      Prior to Admission medications    Medication Sig Start Date End Date Taking?  Authorizing Provider   ferrous sulfate 325 (65 Fe) mg tablet Take 1 tablet (325 mg total) by mouth daily 20  Yes Sherri Mars DO   metroNIDAZOLE (METROCREAM) 0 75 % cream Apply topically 2 (two) times a day 10/13/20  Yes Raul Sheppard MD   naproxen (NAPROSYN) 500 mg tablet Take 1 tablet (500 mg total) by mouth 2 (two) times a day with meals 20  Yes Demarco Huizar DO   norethindrone-ethinyl estradiol (JUNEL ) 1-20 MG-MCG per tablet Take 1 tablet by mouth daily 20 Yes Historical Provider, MD Mahsa Mendoza 0 18/ 215/0 25 MG-25 MCG per tablet Take 1 tablet by mouth daily 18  Yes Historical Provider, MD   valACYclovir (VALTREX) 500 mg tablet TAKE 1 TABLET BY MOUTH TWICE A DAY  Patient taking differently: No sig reported 4/15/20 4/15/21 Yes Gagan Ortiz MD     Allergies   Allergen Reactions    Pollen Extract Sneezing and Nasal Congestion     Depending on the season       Ralu Sheppard MD  2021,8:26 AM    Portions of the record may have been created with voice recognition software   Occasional wrong word or "sound a like" substitutions may have occurred due to the inherent limitations of voice recognition software   Read the chart carefully and recognize, using context, where substitutions have occurred

## 2021-01-22 ENCOUNTER — TELEPHONE (OUTPATIENT)
Dept: FAMILY MEDICINE CLINIC | Facility: HOSPITAL | Age: 41
End: 2021-01-22

## 2021-01-28 ENCOUNTER — CLINICAL SUPPORT (OUTPATIENT)
Dept: DERMATOLOGY | Facility: CLINIC | Age: 41
End: 2021-01-28
Payer: COMMERCIAL

## 2021-01-28 VITALS — TEMPERATURE: 97.6 F

## 2021-01-28 DIAGNOSIS — L97.919 STASIS LEG ULCER, RIGHT (HCC): Primary | ICD-10-CM

## 2021-01-28 DIAGNOSIS — I83.019 STASIS LEG ULCER, RIGHT (HCC): Primary | ICD-10-CM

## 2021-01-28 PROCEDURE — 29580 STRAPPING UNNA BOOT: CPT | Performed by: DERMATOLOGY

## 2021-01-28 NOTE — PATIENT INSTRUCTIONS
Go ahead and place DuoDerm Unna boot and Coban dressing for 1 more week on at which point will transition patient to support stockings

## 2021-01-28 NOTE — PROGRESS NOTES
[facility] DERMATOLOGY  [address] [place] [id number]  [phone]  [phone]     MRN: [id number] : 1980  Encounter: [id number]  Patient Information: Leda Hussein    Subjective:     77-year-old female presents for follow-up for stasis ulcer     Objective:   Temp 97 6 °F (36 4 °C) (Temporal)     Physical Exam:    General Appearance:    Alert, cooperative, no distress   Skin:   Previous site of also looks almost completely healed     Assessment:     1  Stasis leg ulcer, right (HCC)           Plan:   Go ahead and place DuoDerm Unna boot and Coban dressing for 1 more week on at which point will transition patient to support stockings      Prior to Admission medications    Medication Sig Start Date End Date Taking?  Authorizing Provider   ferrous sulfate 325 (65 Fe) mg tablet Take 1 tablet (325 mg total) by mouth daily 20  Yes Sherri Mars DO   metroNIDAZOLE (METROCREAM) 0 75 % cream Apply topically 2 (two) times a day 10/13/20  Yes Michae Bernheim, MD   naproxen (NAPROSYN) 500 mg tablet Take 1 tablet (500 mg total) by mouth 2 (two) times a day with meals 20  Yes Billy Harris DO   norethindrone-ethinyl estradiol (JUNEL ) 1-20 MG-MCG per tablet Take 1 tablet by mouth daily 20 Yes Historical Provider, MD Deep Lebron 0 18/0 215/0 25 MG-25 MCG per tablet Take 1 tablet by mouth daily 18  Yes Historical Provider, MD   valACYclovir (VALTREX) 500 mg tablet TAKE 1 TABLET BY MOUTH TWICE A DAY  Patient taking differently: No sig reported 4/15/20 4/15/21 Yes Yfn Patel MD     Allergies   Allergen Reactions    Pollen Extract Sneezing and Nasal Congestion     Depending on the season       Michae Bernheim, MD  2021,8:37 AM    Portions of the record may have been created with voice recognition software   Occasional wrong word or "sound a like" substitutions may have occurred due to the inherent limitations of voice recognition software   Read the chart carefully and recognize, using context, where substitutions have occurred

## 2021-02-04 ENCOUNTER — OFFICE VISIT (OUTPATIENT)
Dept: DERMATOLOGY | Facility: CLINIC | Age: 41
End: 2021-02-04
Payer: COMMERCIAL

## 2021-02-04 VITALS — TEMPERATURE: 97.6 F

## 2021-02-04 DIAGNOSIS — I83.019 STASIS LEG ULCER, RIGHT (HCC): Primary | ICD-10-CM

## 2021-02-04 DIAGNOSIS — L97.919 STASIS LEG ULCER, RIGHT (HCC): Primary | ICD-10-CM

## 2021-02-04 PROCEDURE — 1036F TOBACCO NON-USER: CPT | Performed by: DERMATOLOGY

## 2021-02-04 PROCEDURE — 99212 OFFICE O/P EST SF 10 MIN: CPT | Performed by: DERMATOLOGY

## 2021-02-04 NOTE — PROGRESS NOTES
500 [facility] DERMATOLOGY  [address] [place] [address]  [place] [address]  [phone]     MRN: [id number] : 1980  Encounter: [id number]  Patient Information: Chema Melchor    Subjective:     52-year-old female presents for follow-up secondary to previously noted stasis ulcer no problems noted     Objective:   Temp 97 6 °F (36 4 °C)     Physical Exam:    General Appearance:    Alert, cooperative, no distress   Skin:   Previous ulceration well-healed     Assessment:     1  Stasis leg ulcer, right ([address] )           Plan:   Area completely healed at this point placed DuoDerm on the area to protect the wound also advised her to start wearing a stock support stockings and will plan follow-up again in a year earlier if necessary      Prior to Admission medications    Medication Sig Start Date End Date Taking?  Authorizing Provider   ferrous sulfate 325 (65 Fe) mg tablet Take 1 tablet (325 mg total) by mouth daily 20   Khushi Calvillo DO   metroNIDAZOLE (METROCREAM) 0 75 % cream Apply topically 2 (two) times a day 10/13/20   Adam Plaza MD   naproxen (NAPROSYN) 500 mg tablet Take 1 tablet (500 mg total) by mouth 2 (two) times a day with meals 20   Khushi Calvillo DO   norethindrone-ethinyl estradiol (JUNEL FE ) 1-20 MG-MCG per tablet Take 1 tablet by mouth daily 20  Historical Provider, MD Westone Most 0 18/0 215/0 25 MG-25 MCG per tablet Take 1 tablet by mouth daily 18   Historical Provider, MD   valACYclovir (VALTREX) 500 mg tablet TAKE 1 TABLET BY MOUTH TWICE A DAY  Patient taking differently: No sig reported 4/15/20 4/15/21  Clyde Hurtado MD     Allergies   Allergen Reactions    Pollen Extract Sneezing and Nasal Congestion     Depending on the season       Adam Plaza MD  2021,9:04 AM    Portions of the record may have been created with voice recognition software   Occasional wrong word or "sound a like" substitutions may have occurred due to the inherent limitations of voice recognition software   Read the chart carefully and recognize, using context, where substitutions have occurred

## 2021-02-04 NOTE — PATIENT INSTRUCTIONS
Area completely healed at this point placed DuoDerm on the area to protect the wound also advised her to start wearing a stock support stockings and will plan follow-up again in a year earlier if necessary

## 2021-02-12 ENCOUNTER — OFFICE VISIT (OUTPATIENT)
Dept: URGENT CARE | Facility: CLINIC | Age: 41
End: 2021-02-12
Payer: COMMERCIAL

## 2021-02-12 ENCOUNTER — APPOINTMENT (OUTPATIENT)
Dept: RADIOLOGY | Facility: CLINIC | Age: 41
End: 2021-02-12
Payer: COMMERCIAL

## 2021-02-12 VITALS
HEART RATE: 86 BPM | OXYGEN SATURATION: 96 % | WEIGHT: 167 LBS | DIASTOLIC BLOOD PRESSURE: 84 MMHG | SYSTOLIC BLOOD PRESSURE: 146 MMHG | RESPIRATION RATE: 18 BRPM | TEMPERATURE: 98.1 F | HEIGHT: 63 IN | BODY MASS INDEX: 29.59 KG/M2

## 2021-02-12 DIAGNOSIS — M25.572 ACUTE LEFT ANKLE PAIN: ICD-10-CM

## 2021-02-12 DIAGNOSIS — S93.402A SPRAIN OF LEFT ANKLE, UNSPECIFIED LIGAMENT, INITIAL ENCOUNTER: Primary | ICD-10-CM

## 2021-02-12 PROCEDURE — 73610 X-RAY EXAM OF ANKLE: CPT

## 2021-02-12 PROCEDURE — G0382 LEV 3 HOSP TYPE B ED VISIT: HCPCS | Performed by: PHYSICIAN ASSISTANT

## 2021-02-12 PROCEDURE — 99203 OFFICE O/P NEW LOW 30 MIN: CPT | Performed by: PHYSICIAN ASSISTANT

## 2021-02-12 PROCEDURE — 99283 EMERGENCY DEPT VISIT LOW MDM: CPT | Performed by: PHYSICIAN ASSISTANT

## 2021-02-12 NOTE — PROGRESS NOTES
[facility]        NAME: Bertin Adkins is a 41 y o  female  : 1980    MRN: [id number]  DATE: 2021  TIME: 10:08 AM    Assessment and Plan   Sprain of left ankle, unspecified ligament, initial encounter [[address]93 402A]  1  Sprain of left ankle, unspecified ligament, initial encounter  XR ankle 3+ vw left         Patient Instructions   Patient Instructions     Rest, ice, compression and elevation  Weight bearing as tolerated with crutch assist  Tylenol and motrin OTC  Ace wrap, do not wear while sleeping  Follow up with PCP  Return to clinic with worsening symptoms, pain, swelling, numbness  Ankle Sprain   WHAT YOU NEED TO KNOW:   An ankle sprain happens when 1 or more ligaments in your ankle joint stretch or tear  Ligaments are tough tissues that connect bones  Ligaments support your joints and keep your bones in place  DISCHARGE INSTRUCTIONS:   Return to the emergency department if:   · You have severe pain in your ankle  · Your foot or toes are cold or numb  · Your ankle becomes more weak or unstable (wobbly)  · You are unable to put any weight on your ankle or foot  · Your swelling has increased or returned  Call your doctor if:   · Your pain does not go away, even after treatment  · You have questions or concerns about your condition or care  Medicines: You may need any of the following:  · NSAIDs , such as ibuprofen, help decrease swelling, pain, and fever  This medicine is available with or without a doctor's order  NSAIDs can cause stomach bleeding or kidney problems in certain people  If you take blood thinner medicine, always ask your healthcare provider if NSAIDs are safe for you  Always read the medicine label and follow directions  · Acetaminophen  decreases pain and fever  It is available without a doctor's order  Ask how much to take and how often to take it  Follow directions   Read the labels of all other medicines you are using to see if they also contain acetaminophen, or ask your doctor or pharmacist  Acetaminophen can cause liver damage if not taken correctly  Do not use more than 4 grams (4,000 milligrams) total of acetaminophen in one day  · Prescription pain medicine  may be given  Ask your healthcare provider how to take this medicine safely  Some prescription pain medicines contain acetaminophen  Do not take other medicines that contain acetaminophen without talking to your healthcare provider  Too much acetaminophen may cause liver damage  Prescription pain medicine may cause constipation  Ask your healthcare provider how to prevent or treat constipation  · Take your medicine as directed  Contact your healthcare provider if you think your medicine is not helping or if you have side effects  Tell him or her if you are allergic to any medicine  Keep a list of the medicines, vitamins, and herbs you take  Include the amounts, and when and why you take them  Bring the list or the pill bottles to follow-up visits  Carry your medicine list with you in case of an emergency  Self-care:   · Use support devices,  such as a brace, cast, or splint, to limit your movement and protect your joint  You may need to use crutches to decrease your pain as you move around  · Go to physical therapy as directed  A physical therapist teaches you exercises to help improve movement and strength, and to decrease pain  · Rest  your ankle so that it can heal  Return to normal activities as directed  · Apply ice  on your ankle for 15 to 20 minutes every hour or as directed  Use an ice pack, or put crushed ice in a plastic bag  Cover it with a towel  Ice helps prevent tissue damage and decreases swelling and pain  · Compress  your ankle  Ask if you should wrap an elastic bandage around your injured ligament  An elastic bandage provides support and helps decrease swelling and movement so your joint can heal  Wear as long as directed      · Elevate  your ankle above the level of your heart as often as you can  This will help decrease swelling and pain  Prop your ankle on pillows or blankets to keep it elevated comfortably  Prevent another ankle sprain:   · Let your ankle heal   Find out how long your ligament needs to heal  Do not do any physical activity until your healthcare provider says it is okay  If you start activity too soon, you may develop a more serious injury  · Always warm up and stretch  before you exercise or play sports  · Use the right equipment  Always wear shoes that fit well and are made for the activity that you are doing  You may also need ankle supports, elbow and knee pads, or braces  Follow up with your doctor as directed:  Write down your questions so you remember to ask them during your visits  © Copyright [address] Information is for End User's use only and may not be sold, redistributed or otherwise used for commercial purposes  All illustrations and images included in CareNotes® are the copyrighted property of A D A M , Inc  or [address] [place] [address]  The above information is an  only  It is not intended as medical advice for individual conditions or treatments  Talk to your doctor, nurse or pharmacist before following any medical regimen to see if it is safe and effective for you  Follow up with PCP in 3-5 days  Proceed to  ER if symptoms worsen  Chief Complaint     Chief Complaint   Patient presents with    Ankle Pain     L ankle pain  slipped and fell on Wed in her driveway when she was cleaning snow off her car  able to bear weight but pain has only improved slightly  History of Present Illness       33yo female presents to clinic with complaints of left ankle pain x 2 days  She was clearing snow of her vehicle while wearing her husbands boots which do not fit well, she slipped and twisted her left ankle   She reports pain immediately and has had difficulty bearing weight due to pain  She has been resting, taking OTC NSAIDs, applying ice with some relief  Denies numbness, tingling, pervious ankle injuries/surgeries  Review of Systems   Review of Systems   Constitutional: Negative for fever  HENT: Negative  Respiratory: Negative for shortness of breath  Cardiovascular: Negative for chest pain  Gastrointestinal: Negative  Genitourinary: Negative  Musculoskeletal: Positive for arthralgias, gait problem, joint swelling and myalgias  Negative for back pain  Skin: Positive for color change  Negative for wound  Neurological: Positive for weakness  Negative for numbness           Current Medications       Current Outpatient Medications:     norethindrone-ethinyl estradiol (JUNEL FE 1/20) 1-20 MG-MCG per tablet, Take 1 tablet by mouth daily, Disp: , Rfl:     valACYclovir (VALTREX) 500 mg tablet, TAKE 1 TABLET BY MOUTH TWICE A DAY (Patient taking differently: No sig reported), Disp: 60 tablet, Rfl: 11    ferrous sulfate 325 (65 Fe) mg tablet, Take 1 tablet (325 mg total) by mouth daily (Patient not taking: Reported on 2/12/2021), Disp: 20 tablet, Rfl: 0    metroNIDAZOLE (METROCREAM) 0 75 % cream, Apply topically 2 (two) times a day (Patient not taking: Reported on 2/12/2021), Disp: 45 g, Rfl: 0    naproxen (NAPROSYN) 500 mg tablet, Take 1 tablet (500 mg total) by mouth 2 (two) times a day with meals (Patient not taking: Reported on 2/12/2021), Disp: 14 tablet, Rfl: 0    TRI-LO-MACIE 0 18/0 215/0 25 MG-25 MCG per tablet, Take 1 tablet by mouth daily, Disp: , Rfl: 4    Current Allergies     Allergies as of 02/12/2021 - Reviewed 02/12/2021   Allergen Reaction Noted    Pollen extract Sneezing and Nasal Congestion 02/05/2020            The following portions of the patient's history were reviewed and updated as appropriate: allergies, current medications, past family history, past medical history, past social history, past surgical history and problem list      Past Medical History:   Diagnosis Date    Benign neoplasm of skin     [facility] )     Hyperparathyroidism, primary ([facility] )     last assessed 2015       Past Surgical History:   Procedure Laterality Date   [facility]      Neurosurgery for brain surgery;  last assessed [id number]     SECTION      FOOT SURGERY         Family History   Problem Relation Age of Onset    Diabetes Mother     Heart disease Mother     COPD Mother     Hypertension Mother     Hyperlipidemia Mother     Stroke Mother     Diabetes Father     Heart disease Father     Alcohol abuse Father     Cirrhosis Father     Hypertension Father     Colon cancer Maternal Grandmother     Ovarian cancer Maternal Grandmother     Thyroid disease Sister          Medications have been verified  Objective   /84 (BP Location: Left arm, Patient Position: Sitting)   Pulse 86   Temp 98 1 °F (36 7 °C) (Temporal)   Resp 18   Ht 5' 3" (1 6 m)   Wt 75 8 kg (167 lb)   LMP 01/15/2021   SpO2 96%   BMI 29 58 kg/m²        Physical Exam     Physical Exam  Vitals signs and nursing note reviewed  Constitutional:       General: She is not in acute distress  Appearance: Normal appearance  HENT:      Head: Normocephalic and atraumatic  Cardiovascular:      Rate and Rhythm: Normal rate  Pulmonary:      Effort: Pulmonary effort is normal    Musculoskeletal:      Left ankle: She exhibits swelling and ecchymosis  She exhibits normal range of motion, no deformity and normal pulse  Tenderness  Lateral malleolus tenderness found  Achilles tendon exhibits no pain  Skin:     Findings: Bruising present  Neurological:      Mental Status: She is alert  Sensory: No sensory deficit

## 2021-02-12 NOTE — PATIENT INSTRUCTIONS
Rest, ice, compression and elevation  Weight bearing as tolerated with crutch assist  Tylenol and motrin OTC  Ace wrap, do not wear while sleeping  Follow up with PCP  Return to clinic with worsening symptoms, pain, swelling, numbness  Ankle Sprain   WHAT YOU NEED TO KNOW:   An ankle sprain happens when 1 or more ligaments in your ankle joint stretch or tear  Ligaments are tough tissues that connect bones  Ligaments support your joints and keep your bones in place  DISCHARGE INSTRUCTIONS:   Return to the emergency department if:   · You have severe pain in your ankle  · Your foot or toes are cold or numb  · Your ankle becomes more weak or unstable (wobbly)  · You are unable to put any weight on your ankle or foot  · Your swelling has increased or returned  Call your doctor if:   · Your pain does not go away, even after treatment  · You have questions or concerns about your condition or care  Medicines: You may need any of the following:  · NSAIDs , such as ibuprofen, help decrease swelling, pain, and fever  This medicine is available with or without a doctor's order  NSAIDs can cause stomach bleeding or kidney problems in certain people  If you take blood thinner medicine, always ask your healthcare provider if NSAIDs are safe for you  Always read the medicine label and follow directions  · Acetaminophen  decreases pain and fever  It is available without a doctor's order  Ask how much to take and how often to take it  Follow directions  Read the labels of all other medicines you are using to see if they also contain acetaminophen, or ask your doctor or pharmacist  Acetaminophen can cause liver damage if not taken correctly  Do not use more than 4 grams (4,000 milligrams) total of acetaminophen in one day  · Prescription pain medicine  may be given  Ask your healthcare provider how to take this medicine safely  Some prescription pain medicines contain acetaminophen   Do not take other medicines that contain acetaminophen without talking to your healthcare provider  Too much acetaminophen may cause liver damage  Prescription pain medicine may cause constipation  Ask your healthcare provider how to prevent or treat constipation  · Take your medicine as directed  Contact your healthcare provider if you think your medicine is not helping or if you have side effects  Tell him or her if you are allergic to any medicine  Keep a list of the medicines, vitamins, and herbs you take  Include the amounts, and when and why you take them  Bring the list or the pill bottles to follow-up visits  Carry your medicine list with you in case of an emergency  Self-care:   · Use support devices,  such as a brace, cast, or splint, to limit your movement and protect your joint  You may need to use crutches to decrease your pain as you move around  · Go to physical therapy as directed  A physical therapist teaches you exercises to help improve movement and strength, and to decrease pain  · Rest  your ankle so that it can heal  Return to normal activities as directed  · Apply ice  on your ankle for 15 to 20 minutes every hour or as directed  Use an ice pack, or put crushed ice in a plastic bag  Cover it with a towel  Ice helps prevent tissue damage and decreases swelling and pain  · Compress  your ankle  Ask if you should wrap an elastic bandage around your injured ligament  An elastic bandage provides support and helps decrease swelling and movement so your joint can heal  Wear as long as directed  · Elevate  your ankle above the level of your heart as often as you can  This will help decrease swelling and pain  Prop your ankle on pillows or blankets to keep it elevated comfortably  Prevent another ankle sprain:   · Let your ankle heal   Find out how long your ligament needs to heal  Do not do any physical activity until your healthcare provider says it is okay   If you start activity too soon, you may develop a more serious injury  · Always warm up and stretch  before you exercise or play sports  · Use the right equipment  Always wear shoes that fit well and are made for the activity that you are doing  You may also need ankle supports, elbow and knee pads, or braces  Follow up with your doctor as directed:  Write down your questions so you remember to ask them during your visits  © Copyright [address] Information is for End User's use only and may not be sold, redistributed or otherwise used for commercial purposes  All illustrations and images included in CareNotes® are the copyrighted property of A D A M , Inc  or [address] [place] [address]  The above information is an  only  It is not intended as medical advice for individual conditions or treatments  Talk to your doctor, nurse or pharmacist before following any medical regimen to see if it is safe and effective for you

## 2021-08-18 ENCOUNTER — APPOINTMENT (OUTPATIENT)
Dept: LAB | Facility: CLINIC | Age: 41
End: 2021-08-18
Payer: COMMERCIAL

## 2021-08-18 ENCOUNTER — OFFICE VISIT (OUTPATIENT)
Dept: INTERNAL MEDICINE CLINIC | Facility: CLINIC | Age: 41
End: 2021-08-18
Payer: COMMERCIAL

## 2021-08-18 VITALS
OXYGEN SATURATION: 99 % | SYSTOLIC BLOOD PRESSURE: 136 MMHG | TEMPERATURE: 99.6 F | HEIGHT: 63 IN | HEART RATE: 94 BPM | BODY MASS INDEX: 29.59 KG/M2 | DIASTOLIC BLOOD PRESSURE: 88 MMHG | WEIGHT: 167 LBS

## 2021-08-18 DIAGNOSIS — J31.0 RHINOSINUSITIS: ICD-10-CM

## 2021-08-18 DIAGNOSIS — J32.9 RHINOSINUSITIS: ICD-10-CM

## 2021-08-18 DIAGNOSIS — I87.2 VENOUS INSUFFICIENCY: Primary | ICD-10-CM

## 2021-08-18 DIAGNOSIS — Z00.00 HEALTHCARE MAINTENANCE: ICD-10-CM

## 2021-08-18 DIAGNOSIS — H66.91 OTITIS OF RIGHT EAR: ICD-10-CM

## 2021-08-18 DIAGNOSIS — B00.9 HERPES SIMPLEX: ICD-10-CM

## 2021-08-18 LAB
ALBUMIN SERPL BCP-MCNC: 3.6 G/DL (ref 3.5–5)
ALP SERPL-CCNC: 53 U/L (ref 46–116)
ALT SERPL W P-5'-P-CCNC: 20 U/L (ref 12–78)
ANION GAP SERPL CALCULATED.3IONS-SCNC: 6 MMOL/L (ref 4–13)
AST SERPL W P-5'-P-CCNC: 14 U/L (ref 5–45)
BACTERIA UR QL AUTO: ABNORMAL /HPF
BASOPHILS # BLD AUTO: 0.06 THOUSANDS/ΜL (ref 0–0.1)
BASOPHILS NFR BLD AUTO: 1 % (ref 0–1)
BILIRUB SERPL-MCNC: 0.42 MG/DL (ref 0.2–1)
BILIRUB UR QL STRIP: NEGATIVE
BUN SERPL-MCNC: 12 MG/DL (ref 5–25)
CALCIUM SERPL-MCNC: 9.4 MG/DL (ref 8.3–10.1)
CHLORIDE SERPL-SCNC: 108 MMOL/L (ref 100–108)
CHOLEST SERPL-MCNC: 285 MG/DL (ref 50–200)
CLARITY UR: CLEAR
CO2 SERPL-SCNC: 24 MMOL/L (ref 21–32)
COLOR UR: YELLOW
CREAT SERPL-MCNC: 0.74 MG/DL (ref 0.6–1.3)
EOSINOPHIL # BLD AUTO: 0.17 THOUSAND/ΜL (ref 0–0.61)
EOSINOPHIL NFR BLD AUTO: 2 % (ref 0–6)
ERYTHROCYTE [DISTWIDTH] IN BLOOD BY AUTOMATED COUNT: 13.6 % (ref 11.6–15.1)
GFR SERPL CREATININE-BSD FRML MDRD: 100 ML/MIN/1.73SQ M
GLUCOSE P FAST SERPL-MCNC: 77 MG/DL (ref 65–99)
GLUCOSE UR STRIP-MCNC: NEGATIVE MG/DL
HCT VFR BLD AUTO: 42.7 % (ref 34.8–46.1)
HDLC SERPL-MCNC: 39 MG/DL
HGB BLD-MCNC: 13.4 G/DL (ref 11.5–15.4)
HGB UR QL STRIP.AUTO: NEGATIVE
IGA SERPL-MCNC: 184 MG/DL (ref 70–400)
IGG SERPL-MCNC: 596 MG/DL (ref 700–1600)
IGM SERPL-MCNC: 72 MG/DL (ref 40–230)
IMM GRANULOCYTES # BLD AUTO: 0.04 THOUSAND/UL (ref 0–0.2)
IMM GRANULOCYTES NFR BLD AUTO: 0 % (ref 0–2)
KETONES UR STRIP-MCNC: NEGATIVE MG/DL
LDLC SERPL CALC-MCNC: 174 MG/DL (ref 0–100)
LEUKOCYTE ESTERASE UR QL STRIP: NEGATIVE
LYMPHOCYTES # BLD AUTO: 2.18 THOUSANDS/ΜL (ref 0.6–4.47)
LYMPHOCYTES NFR BLD AUTO: 24 % (ref 14–44)
MCH RBC QN AUTO: 28.6 PG (ref 26.8–34.3)
MCHC RBC AUTO-ENTMCNC: 31.4 G/DL (ref 31.4–37.4)
MCV RBC AUTO: 91 FL (ref 82–98)
MONOCYTES # BLD AUTO: 0.61 THOUSAND/ΜL (ref 0.17–1.22)
MONOCYTES NFR BLD AUTO: 7 % (ref 4–12)
NEUTROPHILS # BLD AUTO: 5.99 THOUSANDS/ΜL (ref 1.85–7.62)
NEUTS SEG NFR BLD AUTO: 66 % (ref 43–75)
NITRITE UR QL STRIP: NEGATIVE
NON-SQ EPI CELLS URNS QL MICRO: ABNORMAL /HPF
NRBC BLD AUTO-RTO: 0 /100 WBCS
PH UR STRIP.AUTO: 7.5 [PH]
PLATELET # BLD AUTO: 378 THOUSANDS/UL (ref 149–390)
PMV BLD AUTO: 11.1 FL (ref 8.9–12.7)
POTASSIUM SERPL-SCNC: 4.1 MMOL/L (ref 3.5–5.3)
PROT SERPL-MCNC: 7.4 G/DL (ref 6.4–8.2)
PROT UR STRIP-MCNC: ABNORMAL MG/DL
PTH-INTACT SERPL-MCNC: 49 PG/ML (ref 18.4–80.1)
RBC # BLD AUTO: 4.68 MILLION/UL (ref 3.81–5.12)
RBC #/AREA URNS AUTO: ABNORMAL /HPF
SODIUM SERPL-SCNC: 138 MMOL/L (ref 136–145)
SP GR UR STRIP.AUTO: 1.03 (ref 1–1.03)
TRIGL SERPL-MCNC: 359 MG/DL
TSH SERPL DL<=0.05 MIU/L-ACNC: 1.12 UIU/ML (ref 0.36–3.74)
UROBILINOGEN UR QL STRIP.AUTO: 0.2 E.U./DL
WBC # BLD AUTO: 9.05 THOUSAND/UL (ref 4.31–10.16)
WBC #/AREA URNS AUTO: ABNORMAL /HPF

## 2021-08-18 PROCEDURE — 81001 URINALYSIS AUTO W/SCOPE: CPT | Performed by: INTERNAL MEDICINE

## 2021-08-18 PROCEDURE — 82652 VIT D 1 25-DIHYDROXY: CPT

## 2021-08-18 PROCEDURE — 80053 COMPREHEN METABOLIC PANEL: CPT

## 2021-08-18 PROCEDURE — 86334 IMMUNOFIX E-PHORESIS SERUM: CPT

## 2021-08-18 PROCEDURE — 85025 COMPLETE CBC W/AUTO DIFF WBC: CPT

## 2021-08-18 PROCEDURE — 84165 PROTEIN E-PHORESIS SERUM: CPT | Performed by: PATHOLOGY

## 2021-08-18 PROCEDURE — 99215 OFFICE O/P EST HI 40 MIN: CPT | Performed by: INTERNAL MEDICINE

## 2021-08-18 PROCEDURE — 36415 COLL VENOUS BLD VENIPUNCTURE: CPT

## 2021-08-18 PROCEDURE — 84165 PROTEIN E-PHORESIS SERUM: CPT

## 2021-08-18 PROCEDURE — 83970 ASSAY OF PARATHORMONE: CPT

## 2021-08-18 PROCEDURE — 84443 ASSAY THYROID STIM HORMONE: CPT

## 2021-08-18 PROCEDURE — 82784 ASSAY IGA/IGD/IGG/IGM EACH: CPT

## 2021-08-18 PROCEDURE — 86334 IMMUNOFIX E-PHORESIS SERUM: CPT | Performed by: PATHOLOGY

## 2021-08-18 PROCEDURE — 80061 LIPID PANEL: CPT

## 2021-08-18 RX ORDER — FEXOFENADINE HCL AND PSEUDOEPHEDRINE HCI 60; 120 MG/1; MG/1
1 TABLET, EXTENDED RELEASE ORAL 2 TIMES DAILY
Qty: 60 TABLET | Refills: 2 | Status: SHIPPED | OUTPATIENT
Start: 2021-08-18 | End: 2021-11-24

## 2021-08-18 RX ORDER — CIPROFLOXACIN 500 MG/1
500 TABLET, FILM COATED ORAL EVERY 12 HOURS SCHEDULED
Qty: 14 TABLET | Refills: 0 | Status: SHIPPED | OUTPATIENT
Start: 2021-08-18 | End: 2021-08-25

## 2021-08-18 RX ORDER — VALACYCLOVIR HYDROCHLORIDE 500 MG/1
500 TABLET, FILM COATED ORAL 2 TIMES DAILY
Qty: 60 TABLET | Refills: 11 | Status: SHIPPED | OUTPATIENT
Start: 2021-08-18 | End: 2021-11-24

## 2021-08-18 NOTE — PATIENT INSTRUCTIONS
A 35year-old with some things to be resolved  Chronic ear congestion and superimposed pain: Both eardrums a bit clouded  Right canal swollen  Treat with Cipro for a week and refer to ENT     Chronic venous insufficiency:  A venous study for incompetence and referral to vascular     Suppression of herpes with Valtrex     Healthcare maintenance with laboratory screens    Recheck of prior abnormal PTH which did appear to resolve on it

## 2021-08-18 NOTE — PROGRESS NOTES
Assessment/Plan:       Diagnoses and all orders for this visit:    Venous insufficiency  -     VAS Lower extremity venous insufficiency duplex, bilateral w/ measurements; Future  -     Ambulatory referral to Vascular Surgery; Future    Herpes simplex  -     valACYclovir (VALTREX) 500 mg tablet; Take 1 tablet (500 mg total) by mouth 2 (two) times a day    Otitis of right ear  -     Ambulatory Referral to Otolaryngology; Future  -     ciprofloxacin (CIPRO) 500 mg tablet; Take 1 tablet (500 mg total) by mouth every 12 (twelve) hours for 7 days    Healthcare maintenance  -     Lipid Panel with Direct LDL reflex; Future  -     CBC and differential; Future  -     Comprehensive metabolic panel; Future  -     TSH, 3rd generation with Free T4 reflex; Future  -     UA (URINE) with reflex to Scope  -     PTH, intact; Future  -     IgG, IgA, IgM; Future  -     Protein electrophoresis, serum; Future  -     Vitamin D 1,25 dihydroxy; Future    Rhinosinusitis  -     fexofenadine-pseudoephedrine (ALLEGRA-D)  MG per tablet; Take 1 tablet by mouth 2 (two) times a day                Subjective:      Patient ID: Jesusita Vogt is a 35 y o  female  A 64-year-old female with a number of complaints today   Chronic lower extremity edema with sensation of tightness  This is been ongoing for number of years  Working diagnosis is venous insufficiency  She actually had an ulcer that took months to resolve and now uses chronic compression stocking  Gynecology center for a venous study last week but this was only limited study to look for DVT, not a study for valve function and chronic insufficiency  Chronic right ear congestion sensation with superimposed 2 day history of right ear pain which actually is a bit better in the past couple of days  Also, associated sensation of "scratchy throat "      No fever or chills or sweats       Herpes simplex outbreaks suppressed with Valcyclovir p r n ; needs a refill     Would like laboratory panels done     Remote history of astrocytoma cured  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; actually a bit better in the past year  The following portions of the patient's history were reviewed and updated as appropriate:   She has a past medical history of Benign neoplasm of skin, Brain cancer ([facility] [address] ), and Hyperparathyroidism, primary ([facility] [address] )  ,  does not have any pertinent problems on file  ,   has a past surgical history that includes Brain surgery;  section; and Foot surgery  ,  family history includes Alcohol abuse in her father; COPD in her mother; Cirrhosis in her father; Colon cancer in her maternal grandmother; Diabetes in her father and mother; Heart disease in her father and mother; Hyperlipidemia in her mother; Hypertension in her father and mother; Ovarian cancer in her maternal grandmother; Stroke in her mother; Thyroid disease in her sister  ,   reports that she quit smoking about 20 years ago  Her smoking use included cigarettes  She has a 7 50 pack-year smoking history  She has never used smokeless tobacco  She reports current alcohol use  She reports that she does not use drugs  ,  is allergic to pollen extract     Current Outpatient Medications   Medication Sig Dispense Refill    TRI-LO-MACIE 0 18/0 215/0 25 MG-25 MCG per tablet Take 1 tablet by mouth daily  4    valACYclovir (VALTREX) 500 mg tablet Take 1 tablet (500 mg total) by mouth 2 (two) times a day 60 tablet 11    ciprofloxacin (CIPRO) 500 mg tablet Take 1 tablet (500 mg total) by mouth every 12 (twelve) hours for 7 days 14 tablet 0    fexofenadine-pseudoephedrine (ALLEGRA-D)  MG per tablet Take 1 tablet by mouth 2 (two) times a day 60 tablet 2    norethindrone-ethinyl estradiol (JUNEL ) 1-20 MG-MCG per tablet Take 1 tablet by mouth daily (Patient not taking: Reported on 2021)       No current facility-administered medications for this visit         Review of Systems   HENT: Positive for ear discharge and ear pain  Cardiovascular: Positive for leg swelling  Psychiatric/Behavioral: Positive for dysphoric mood  The patient is nervous/anxious  All other systems reviewed and are negative  Objective:  Vitals:    08/18/21 0856   BP: 136/88   Pulse: 94   Temp: 99 6 °F (37 6 °C)   SpO2: 99%      Physical Exam  Constitutional:       Appearance: She is well-developed  HENT:      Head: Normocephalic and atraumatic  Ears:      Comments: Both tympanic membranes a bit dull   Right ear canal appears swollen  Eyes:      Pupils: Pupils are equal, round, and reactive to light  Neck:      Thyroid: No thyromegaly  Trachea: No tracheal deviation  Cardiovascular:      Rate and Rhythm: Normal rate and regular rhythm  Heart sounds: Normal heart sounds  No murmur heard  No gallop  Pulmonary:      Effort: No respiratory distress  Breath sounds: No wheezing or rales  Abdominal:      General: Bowel sounds are normal       Palpations: Abdomen is soft  Tenderness: There is no abdominal tenderness  Musculoskeletal:         General: Swelling present  No tenderness or deformity  Normal range of motion  Cervical back: Normal range of motion and neck supple  Comments:   1+ edema both calves   Superficial varicosities  Skin:     General: Skin is warm  Neurological:      Mental Status: She is alert and oriented to person, place, and time  Coordination: Coordination normal    Psychiatric:         Judgment: Judgment normal            Patient Instructions    A 35year-old with some things to be resolved  Chronic ear congestion and superimposed pain: Both eardrums a bit clouded  Right canal swollen  Treat with Cipro for a week and refer to ENT     Chronic venous insufficiency:  A venous study for incompetence and referral to vascular     Suppression of herpes with Valtrex     Healthcare maintenance with laboratory screens    Recheck of prior abnormal PTH which did appear to resolve on it

## 2021-08-20 LAB
1,25(OH)2D3 SERPL-MCNC: 71.5 PG/ML (ref 19.9–79.3)
ALBUMIN SERPL ELPH-MCNC: 4.45 G/DL (ref 3.5–5)
ALBUMIN SERPL ELPH-MCNC: 61 % (ref 52–65)
ALPHA1 GLOB SERPL ELPH-MCNC: 0.39 G/DL (ref 0.1–0.4)
ALPHA1 GLOB SERPL ELPH-MCNC: 5.4 % (ref 2.5–5)
ALPHA2 GLOB SERPL ELPH-MCNC: 0.96 G/DL (ref 0.4–1.2)
ALPHA2 GLOB SERPL ELPH-MCNC: 13.2 % (ref 7–13)
BETA GLOB ABNORMAL SERPL ELPH-MCNC: 0.58 G/DL (ref 0.4–0.8)
BETA1 GLOB SERPL ELPH-MCNC: 7.9 % (ref 5–13)
BETA2 GLOB SERPL ELPH-MCNC: 4.8 % (ref 2–8)
BETA2+GAMMA GLOB SERPL ELPH-MCNC: 0.35 G/DL (ref 0.2–0.5)
GAMMA GLOB ABNORMAL SERPL ELPH-MCNC: 0.56 G/DL (ref 0.5–1.6)
GAMMA GLOB SERPL ELPH-MCNC: 7.7 % (ref 12–22)
IGG/ALB SER: 1.56 {RATIO} (ref 1.1–1.8)
INTERPRETATION UR IFE-IMP: NORMAL
PROT PATTERN SERPL ELPH-IMP: ABNORMAL
PROT SERPL-MCNC: 7.3 G/DL (ref 6.4–8.2)

## 2021-09-22 ENCOUNTER — HOSPITAL ENCOUNTER (OUTPATIENT)
Dept: NON INVASIVE DIAGNOSTICS | Facility: CLINIC | Age: 41
Discharge: HOME | End: 2021-09-22
Payer: COMMERCIAL

## 2021-09-22 DIAGNOSIS — I87.2 VENOUS INSUFFICIENCY: ICD-10-CM

## 2021-09-22 PROCEDURE — 93970 EXTREMITY STUDY: CPT

## 2021-09-22 PROCEDURE — 93970 EXTREMITY STUDY: CPT | Performed by: SURGERY

## 2021-09-27 ENCOUNTER — CONSULT (OUTPATIENT)
Dept: VASCULAR SURGERY | Facility: CLINIC | Age: 41
End: 2021-09-27
Payer: COMMERCIAL

## 2021-09-27 VITALS
SYSTOLIC BLOOD PRESSURE: 122 MMHG | BODY MASS INDEX: 30.73 KG/M2 | HEART RATE: 87 BPM | HEIGHT: 62 IN | TEMPERATURE: 98.8 F | WEIGHT: 167 LBS | DIASTOLIC BLOOD PRESSURE: 90 MMHG

## 2021-09-27 DIAGNOSIS — I87.2 VENOUS INSUFFICIENCY: Primary | ICD-10-CM

## 2021-09-27 DIAGNOSIS — I83.813 VARICOSE VEINS OF BOTH LOWER EXTREMITIES WITH PAIN: ICD-10-CM

## 2021-09-27 PROCEDURE — 99243 OFF/OP CNSLTJ NEW/EST LOW 30: CPT | Performed by: NURSE PRACTITIONER

## 2021-09-27 RX ORDER — NORGESTIMATE AND ETHINYL ESTRADIOL 7DAYSX3 LO
1 KIT ORAL DAILY
COMMUNITY
Start: 2021-09-23 | End: 2022-06-22

## 2021-09-27 RX ORDER — ASPIRIN 81 MG/1
81 TABLET ORAL DAILY
COMMUNITY
End: 2022-02-15 | Stop reason: ALTCHOICE

## 2021-09-27 NOTE — PROGRESS NOTES
Assessment/Plan:    Varicose veins of both lower extremities with pain  Patient is a 78-year-old female with history venous insufficiency with varicose veins to bilateral lower extremities and history of RLE venous ulceration (resolved) who presents the office today with complaints aching, tired, heavy legs with bulging veins R>L  - patient reports varicose veins since age 10  - h/o RLE shin venous ulcer November 2020-February 2021 with weekly Unna boot therapy  - patient reports use of daily compression  - h/o 2 pregnancies  - currently on OCP  - denies personal or family history of DVT  - history of VV on maternal side    Plan:  - continued use of medical grade compression stockings 20-30 mmHg to be worn daily     - Frequent ambulation leg elevation at rest     - Return to office with surgeon to review LEVDR and make formal recommendations  - consider statin therapy per PCP       Diagnoses and all orders for this visit:    Venous insufficiency  -     Ambulatory referral to Vascular Surgery  -     Compression Stocking    Varicose veins of both lower extremities with pain  -     Compression Stocking    Other orders  -     norgestimate-ethinyl estradiol (ORTHO TRI-CYCLEN LO) 0 18/0 215/0 25 MG-25 MCG per tablet; Take 1 tablet by mouth daily (Patient not taking: Reported on 9/27/2021)  -     aspirin (ECOTRIN LOW STRENGTH) 81 mg EC tablet; Take 81 mg by mouth daily          Subjective:      Patient ID: Antonino Kelly is a 35 y o  female  New patient here for consult about varicose veins  Patient had a LEVDR done on 9/22/21  Patient complain of pain, bulging veins, itching, dermatitis, heaviness, aching swelling and tightness in bilateral lower extremities  Patient states that right leg is more problematic than the left leg  Patient have never been treated for this condition in the past  Patient is wearing compression stocking and is elevating the legs  Patient is a former smoker        HPI     Antonino Kelly is seen for evaluation of: [x]Varicose veins/legs  []Spider veins/legs  []Spider veins/face  []Venous stasis ulcer  []Superficial thrombophlebitis  []Other -      She complains of []none  [x]bulging veins  [x]dilated veins  []discolored veins         There is []no edema              [x]right leg edema  []left leg edema       []bilateral lower extremity edema     There is   []no leg pain          []right leg pain  []left leg pain         []bilateral leg pain  []bilateral leg pain; L>R   [x]bilateral leg pain; R>L     Pain is described as [x]aching              [x]itching  []sharp                [x]burning  [x]throbbing         []stinging  [x]heavy                []dull  []other -      Symptoms have been ongoing for:  years   There is  []no pertinent medical history  []history of DVT  []PE  []superficial venous thrombosis  Hx of ulcer - RIGHT   Prior treatment includes []none  []EVLT  []OTC stockings  [x]prescription compression stockings  []vein ligation  []vein stripping  []stab phlebectomy  []sclerotherapy injections  []Other -      Current treatment includes []none  [x]compression socks  []avoiding tight clothing  [x]leg elevation  []rest  []exercise  []weight management  []skin care  []periodic evaluation   []Other-     Treatment has been []effective  [x]ineffective       The following portions of the patient's history were reviewed and updated as appropriate: allergies, current medications, past family history, past medical history, past social history, past surgical history and problem list     Review of Systems   Constitutional: Negative  HENT: Negative  Cardiovascular: Positive for leg swelling (bilateral )  Gastrointestinal: Negative  Endocrine: Negative  Genitourinary: Negative  Musculoskeletal:        Leg pain    Skin: Negative  Allergic/Immunologic: Negative  Neurological: Negative  Hematological: Negative  Psychiatric/Behavioral: Negative          I have reviewed and made appropriate changes to the review of systems input by the medical assistant  Objective:      /90 (BP Location: Left arm, Patient Position: Sitting, Cuff Size: Standard)   Pulse 87   Temp 98 8 °F (37 1 °C) (Tympanic)   Ht 5' 2" (1 575 m)   Wt 75 8 kg (167 lb)   BMI 30 54 kg/m²          Physical Exam  Vitals reviewed  Constitutional:       Appearance: Normal appearance  HENT:      Head: Normocephalic and atraumatic  Cardiovascular:      Rate and Rhythm: Normal rate and regular rhythm  Pulses: Normal pulses  Radial pulses are 2+ on the right side and 2+ on the left side  Femoral pulses are 2+ on the right side and 2+ on the left side  Posterior tibial pulses are 2+ on the right side and 2+ on the left side  Heart sounds: Normal heart sounds  Pulmonary:      Effort: Pulmonary effort is normal       Breath sounds: Normal breath sounds  Musculoskeletal:         General: Normal range of motion  Cervical back: Normal range of motion  Right lower leg: Edema present  Skin:     General: Skin is warm and dry  Comments: BLE varicose veins medial knee/ prox calf  R>L  Scattered telangiectasia BLE/ ankles   Neurological:      Mental Status: She is alert and oriented to person, place, and time     Psychiatric:         Mood and Affect: Mood normal          Behavior: Behavior normal          RLE        RLE H/o ulcer (resolved)      Vitals:    09/27/21 0929   BP: 122/90   BP Location: Left arm   Patient Position: Sitting   Cuff Size: Standard   Pulse: 87   Temp: 98 8 °F (37 1 °C)   TempSrc: Tympanic   Weight: 75 8 kg (167 lb)   Height: 5' 2" (1 575 m)       Patient Active Problem List   Diagnosis    Herpes simplex    Hypoalbuminemia    Anxious depression    Chronic fatigue    Nail dystrophy    Purpura (HCC)    Heart murmur    Skin ulcer, limited to breakdown of skin ([address] )    Need for vaccination    Otitis of right ear    Venous insufficiency    Rhinosinusitis  Varicose veins of both lower extremities with pain       Past Surgical History:   Procedure Laterality Date   [address]      Neurosurgery for brain surgery;  last assessed [id number]     SECTION      FOOT SURGERY         Family History   Problem Relation Age of Onset    Diabetes Mother     Heart disease Mother     COPD Mother     Hypertension Mother    Elwyn Serge Hyperlipidemia Mother     Stroke Mother     Diabetes Father     Heart disease Father     Alcohol abuse Father     Cirrhosis Father     Hypertension Father     Colon cancer Maternal Grandmother     Ovarian cancer Maternal Grandmother     Thyroid disease Sister        Social History     Socioeconomic History    Marital status: /Civil Union     Spouse name: Not on file    Number of children: Not on file    Years of education: Not on file    Highest education level: Not on file   Occupational History    Not on file   Tobacco Use    Smoking status: Former Smoker     Packs/day: 0 50     Years: 15 00     Pack years: 7 50     Types: Cigarettes     Quit date:      Years since quittin 7    Smokeless tobacco: Never Used    Tobacco comment: never a smoker, per [employer]   Vaping Use    Vaping Use: Never used   Substance and Sexual Activity    Alcohol use: Yes     Comment: social    Drug use: No    Sexual activity: Yes     Partners: Male   Other Topics Concern    Not on file   Social History Narrative    Not on file     Social Determinants of Health     Financial Resource Strain:     Difficulty of Paying Living Expenses:    Food Insecurity:     Worried About Running Out of Food in the Last Year:     [address] in the Last Year:    Transportation Needs:     Lack of Transportation (Medical):      Lack of Transportation (Non-Medical):    Physical Activity: Inactive    Days of Exercise per Week: 0 days    Minutes of Exercise per Session: 0 min   Stress: Stress Concern Present    Feeling of Stress : Very much Social Connections:     Frequency of Communication with Friends and Family:     Frequency of Social Gatherings with Friends and Family:     Attends Druze Services:     Active Member of Clubs or Organizations:     Attends Club or Organization Meetings:     Marital Status:    Intimate Partner Violence:     Fear of Current or Ex-Partner:     Emotionally Abused:     Physically Abused:     Sexually Abused:         Allergies   Allergen Reactions    Pollen Extract Sneezing and Nasal Congestion     Depending on the season         Current Outpatient Medications:     aspirin (ECOTRIN LOW STRENGTH) 81 mg EC tablet, Take 81 mg by mouth daily, Disp: , Rfl:     norethindrone-ethinyl estradiol (JUNEL FE 1/20) 1-20 MG-MCG per tablet, Take 1 tablet by mouth daily , Disp: , Rfl:     fexofenadine-pseudoephedrine (ALLEGRA-D)  MG per tablet, Take 1 tablet by mouth 2 (two) times a day (Patient not taking: Reported on 9/27/2021), Disp: 60 tablet, Rfl: 2    norgestimate-ethinyl estradiol (ORTHO TRI-CYCLEN LO) 0 18/0 215/0 25 MG-25 MCG per tablet, Take 1 tablet by mouth daily (Patient not taking: Reported on 9/27/2021), Disp: , Rfl:     TRI-LO-MACIE 0 18/0 215/0 25 MG-25 MCG per tablet, Take 1 tablet by mouth daily (Patient not taking: Reported on 9/27/2021), Disp: , Rfl: 4    valACYclovir (VALTREX) 500 mg tablet, Take 1 tablet (500 mg total) by mouth 2 (two) times a day (Patient not taking: Reported on 9/27/2021), Disp: 60 tablet, Rfl: 11

## 2021-09-27 NOTE — PATIENT INSTRUCTIONS
Continued use of medical grade compression stockings- daily  20-30mmHG  Frequent ambulation and leg elevation at rest  Return to office to review imaging and make formal reccomendations    Varicose Veins   AMBULATORY CARE:   Varicose veins  are veins that become large, twisted, and swollen  They are common on the back of the calves, knees, and thighs  Varicose veins are caused by valves in your veins that do not work properly  This causes blood to collect and increase pressure in the veins of your legs  The increased pressure causes your veins to stretch, get larger, swell, and twist      Common symptoms include the following: Your symptoms may be worse after you stand or sit for long periods of time  You may have any of the following:  · Blue, purple, or bulging veins in your legs     · Pain, swelling, or muscle cramps in your legs    · Feeling of fatigue or heaviness in your legs    · Cramping in your legs    Seek care immediately if:   · You have a wound that does not heal or is infected  · You have an injury that has broken your skin and caused your varicose veins to bleed  · Your leg is swollen and hard  · You notice that your legs or feet are turning blue or black  · Your leg feels warm, tender, and painful  It may look swollen and red  Contact your healthcare provider if:   · You have pain in your leg that does not go away or gets worse  · You notice sudden large bruising on your legs  · You have a rash on your leg  · Your symptoms keep you from doing your daily activities  · You have questions or concerns about your condition or care  Treatment of varicose veins  aims to decrease symptoms, improve appearance, and prevent further problems  Treatment will depend on which veins are affected and how severe your condition is  You may need procedures to treat or remove your varicose veins   For example, your healthcare provider may inject a solution or use a laser to close the varicose veins  Surgery to remove long veins may also be done  Ask your healthcare provider for more information about procedures used to treat varicose veins  Manage varicose veins:   · Do not sit or stand for long periods of time  This can cause the blood to collect in your legs and make your symptoms worse  Bend or rotate your ankles several times every hour  Walk around for a few minutes every hour to get blood moving in your legs  · Do not cross your legs when you sit  This decreases blood flow to your feet and can make your symptoms worse  · Do not wear tight clothing or shoes  Do not wear high-heeled shoes  Do not wear clothes that are tight around the waist or knees  · Maintain a healthy weight  Being overweight or obese can make your varicose veins worse  Ask your healthcare provider how much you should weigh  Ask him or her to help you create a weight loss plan if you are overweight  · Wear pressure stockings as directed  The stockings are tight and put pressure on your legs  They improve blood flow and help prevent clots  · Elevate your legs  Keep them above the level of your heart for 15 to 30 minutes several times a day  You can also prop the end of your bed up slightly to elevate your legs while you sleep  This will help blood to flow back to your heart  · Get regular exercise  Talk to your healthcare provider about the best exercise plan for you  Exercise can improve blood flow to your legs and feet  Follow up with your healthcare provider as directed:  Write down your questions so you remember to ask them during your visits  © [employer] 2021 Information is for End User's use only and may not be sold, redistributed or otherwise used for commercial purposes  All illustrations and images included in CareNotes® are the copyrighted property of [employer]  or Connie Sotomayor   The above information is an  only   It is not intended as medical advice for individual conditions or treatments  Talk to your doctor, nurse or pharmacist before following any medical regimen to see if it is safe and effective for you

## 2021-10-04 ENCOUNTER — TELEPHONE (OUTPATIENT)
Dept: INTERNAL MEDICINE CLINIC | Facility: CLINIC | Age: 41
End: 2021-10-04

## 2021-10-04 NOTE — TELEPHONE ENCOUNTER
Rickey Winter (Self) [phone] (M)     Patient said Dr called her mother in law's ph and left a message for patient    she is asking Dr to please call her ph    re the message    she doesn't live near her mother in law so doesn't want to go all the way there to listen to the message  Dara Soulier

## 2021-10-13 NOTE — ASSESSMENT & PLAN NOTE
Patient is a 58-year-old female with history venous insufficiency with varicose veins to bilateral lower extremities and history of RLE venous ulceration (resolved) who presents the office today with complaints aching, tired, heavy legs with bulging veins R>L      - patient reports varicose veins since age 10  - h/o RLE shin venous ulcer November 2020-February 2021 with weekly Unna boot therapy  - patient reports use of daily compression  - h/o 2 pregnancies  - currently on OCP  - denies personal or family history of DVT  - history of VV on maternal side    Plan:  - continued use of medical grade compression stockings 20-30 mmHg to be worn daily     - Frequent ambulation leg elevation at rest     - Return to office with surgeon to review LEVDR and make formal recommendations  - consider statin therapy per PCP
(2) more than 100 beats/min

## 2021-11-18 ENCOUNTER — TELEPHONE (OUTPATIENT)
Dept: ADMINISTRATIVE | Facility: HOSPITAL | Age: 41
End: 2021-11-18

## 2021-11-18 ENCOUNTER — OFFICE VISIT (OUTPATIENT)
Dept: VASCULAR SURGERY | Facility: CLINIC | Age: 41
End: 2021-11-18
Payer: COMMERCIAL

## 2021-11-18 VITALS
SYSTOLIC BLOOD PRESSURE: 120 MMHG | WEIGHT: 168 LBS | HEIGHT: 62 IN | DIASTOLIC BLOOD PRESSURE: 80 MMHG | TEMPERATURE: 99.5 F | BODY MASS INDEX: 30.91 KG/M2 | HEART RATE: 86 BPM

## 2021-11-18 DIAGNOSIS — I83.813 VARICOSE VEINS OF BOTH LOWER EXTREMITIES WITH PAIN: Primary | ICD-10-CM

## 2021-11-18 DIAGNOSIS — L98.491 SKIN ULCER, LIMITED TO BREAKDOWN OF SKIN (HCC): ICD-10-CM

## 2021-11-18 PROCEDURE — 99213 OFFICE O/P EST LOW 20 MIN: CPT | Performed by: SURGERY

## 2021-11-18 RX ORDER — CHLORHEXIDINE GLUCONATE 0.12 MG/ML
15 RINSE ORAL ONCE
Status: CANCELLED | OUTPATIENT
Start: 2021-11-18 | End: 2021-11-18

## 2021-11-18 RX ORDER — CEFAZOLIN SODIUM 1 G/50ML
1000 SOLUTION INTRAVENOUS ONCE
Status: CANCELLED | OUTPATIENT
Start: 2021-11-18 | End: 2021-11-18

## 2021-11-22 DIAGNOSIS — I83.813 VARICOSE VEINS OF BOTH LOWER EXTREMITIES WITH PAIN: Primary | ICD-10-CM

## 2021-11-24 ENCOUNTER — TELEPHONE (OUTPATIENT)
Dept: ADMINISTRATIVE | Facility: OTHER | Age: 41
End: 2021-11-24

## 2021-11-24 ENCOUNTER — OFFICE VISIT (OUTPATIENT)
Dept: INTERNAL MEDICINE CLINIC | Facility: CLINIC | Age: 41
End: 2021-11-24
Payer: COMMERCIAL

## 2021-11-24 ENCOUNTER — APPOINTMENT (OUTPATIENT)
Dept: LAB | Facility: CLINIC | Age: 41
End: 2021-11-24
Payer: COMMERCIAL

## 2021-11-24 VITALS
BODY MASS INDEX: 31.21 KG/M2 | WEIGHT: 169.6 LBS | DIASTOLIC BLOOD PRESSURE: 82 MMHG | OXYGEN SATURATION: 97 % | HEART RATE: 91 BPM | HEIGHT: 62 IN | SYSTOLIC BLOOD PRESSURE: 128 MMHG

## 2021-11-24 DIAGNOSIS — Z85.841 HISTORY OF EPENDYMOMA OF BRAIN: ICD-10-CM

## 2021-11-24 DIAGNOSIS — E88.09 PLASMA PROTEIN DISORDER: ICD-10-CM

## 2021-11-24 DIAGNOSIS — E04.9 GOITER: ICD-10-CM

## 2021-11-24 DIAGNOSIS — I83.813 VARICOSE VEINS OF BOTH LOWER EXTREMITIES WITH PAIN: ICD-10-CM

## 2021-11-24 DIAGNOSIS — E31.20: ICD-10-CM

## 2021-11-24 DIAGNOSIS — Z12.31 ENCOUNTER FOR SCREENING MAMMOGRAM FOR BREAST CANCER: ICD-10-CM

## 2021-11-24 DIAGNOSIS — Z23 ENCOUNTER FOR IMMUNIZATION: ICD-10-CM

## 2021-11-24 DIAGNOSIS — E88.09 PLASMA PROTEIN DISORDER: Primary | ICD-10-CM

## 2021-11-24 LAB
ANION GAP SERPL CALCULATED.3IONS-SCNC: 7 MMOL/L (ref 4–13)
BUN SERPL-MCNC: 8 MG/DL (ref 5–25)
CALCIUM SERPL-MCNC: 9.3 MG/DL (ref 8.3–10.1)
CHLORIDE SERPL-SCNC: 107 MMOL/L (ref 100–108)
CO2 SERPL-SCNC: 25 MMOL/L (ref 21–32)
CREAT SERPL-MCNC: 0.8 MG/DL (ref 0.6–1.3)
ERYTHROCYTE [DISTWIDTH] IN BLOOD BY AUTOMATED COUNT: 13.7 % (ref 11.6–15.1)
GFR SERPL CREATININE-BSD FRML MDRD: 91 ML/MIN/1.73SQ M
GLUCOSE P FAST SERPL-MCNC: 88 MG/DL (ref 65–99)
HCT VFR BLD AUTO: 33.3 % (ref 34.8–46.1)
HGB BLD-MCNC: 10.1 G/DL (ref 11.5–15.4)
IGA SERPL-MCNC: 182 MG/DL (ref 70–400)
IGG SERPL-MCNC: 521 MG/DL (ref 700–1600)
IGM SERPL-MCNC: 65 MG/DL (ref 40–230)
MCH RBC QN AUTO: 28 PG (ref 26.8–34.3)
MCHC RBC AUTO-ENTMCNC: 30.3 G/DL (ref 31.4–37.4)
MCV RBC AUTO: 92 FL (ref 82–98)
PLATELET # BLD AUTO: 513 THOUSANDS/UL (ref 149–390)
PMV BLD AUTO: 10.2 FL (ref 8.9–12.7)
POTASSIUM SERPL-SCNC: 3.9 MMOL/L (ref 3.5–5.3)
RBC # BLD AUTO: 3.61 MILLION/UL (ref 3.81–5.12)
SODIUM SERPL-SCNC: 139 MMOL/L (ref 136–145)
T3FREE SERPL-MCNC: 3 PG/ML (ref 2.3–4.2)
T4 FREE SERPL-MCNC: 0.97 NG/DL (ref 0.76–1.46)
TSH SERPL DL<=0.05 MIU/L-ACNC: 2.65 UIU/ML (ref 0.36–3.74)
WBC # BLD AUTO: 10.57 THOUSAND/UL (ref 4.31–10.16)

## 2021-11-24 PROCEDURE — 84443 ASSAY THYROID STIM HORMONE: CPT

## 2021-11-24 PROCEDURE — 84165 PROTEIN E-PHORESIS SERUM: CPT | Performed by: PATHOLOGY

## 2021-11-24 PROCEDURE — 84165 PROTEIN E-PHORESIS SERUM: CPT

## 2021-11-24 PROCEDURE — 86800 THYROGLOBULIN ANTIBODY: CPT

## 2021-11-24 PROCEDURE — 80048 BASIC METABOLIC PNL TOTAL CA: CPT

## 2021-11-24 PROCEDURE — 85027 COMPLETE CBC AUTOMATED: CPT

## 2021-11-24 PROCEDURE — 99214 OFFICE O/P EST MOD 30 MIN: CPT | Performed by: INTERNAL MEDICINE

## 2021-11-24 PROCEDURE — 36415 COLL VENOUS BLD VENIPUNCTURE: CPT

## 2021-11-24 PROCEDURE — 86376 MICROSOMAL ANTIBODY EACH: CPT

## 2021-11-24 PROCEDURE — 82784 ASSAY IGA/IGD/IGG/IGM EACH: CPT

## 2021-11-24 PROCEDURE — 84481 FREE ASSAY (FT-3): CPT

## 2021-11-24 PROCEDURE — 84439 ASSAY OF FREE THYROXINE: CPT

## 2021-11-25 LAB
THYROGLOB AB SERPL-ACNC: <1 IU/ML (ref 0–0.9)
THYROPEROXIDASE AB SERPL-ACNC: <8 IU/ML (ref 0–34)

## 2021-11-26 LAB
ALBUMIN SERPL ELPH-MCNC: 4.25 G/DL (ref 3.5–5)
ALBUMIN SERPL ELPH-MCNC: 59.9 % (ref 52–65)
ALPHA1 GLOB SERPL ELPH-MCNC: 0.4 G/DL (ref 0.1–0.4)
ALPHA1 GLOB SERPL ELPH-MCNC: 5.7 % (ref 2.5–5)
ALPHA2 GLOB SERPL ELPH-MCNC: 0.94 G/DL (ref 0.4–1.2)
ALPHA2 GLOB SERPL ELPH-MCNC: 13.2 % (ref 7–13)
BETA GLOB ABNORMAL SERPL ELPH-MCNC: 0.59 G/DL (ref 0.4–0.8)
BETA1 GLOB SERPL ELPH-MCNC: 8.3 % (ref 5–13)
BETA2 GLOB SERPL ELPH-MCNC: 5.2 % (ref 2–8)
BETA2+GAMMA GLOB SERPL ELPH-MCNC: 0.37 G/DL (ref 0.2–0.5)
GAMMA GLOB ABNORMAL SERPL ELPH-MCNC: 0.55 G/DL (ref 0.5–1.6)
GAMMA GLOB SERPL ELPH-MCNC: 7.7 % (ref 12–22)
IGG/ALB SER: 1.49 {RATIO} (ref 1.1–1.8)
PROT PATTERN SERPL ELPH-IMP: ABNORMAL
PROT SERPL-MCNC: 7.1 G/DL (ref 6.4–8.2)

## 2022-01-10 ENCOUNTER — HOSPITAL ENCOUNTER (OUTPATIENT)
Dept: ULTRASOUND IMAGING | Facility: CLINIC | Age: 42
Discharge: HOME | End: 2022-01-10
Payer: COMMERCIAL

## 2022-01-10 DIAGNOSIS — E04.9 GOITER: ICD-10-CM

## 2022-01-10 PROCEDURE — 76536 US EXAM OF HEAD AND NECK: CPT

## 2022-01-11 ENCOUNTER — TELEPHONE (OUTPATIENT)
Dept: INTERNAL MEDICINE CLINIC | Facility: CLINIC | Age: 42
End: 2022-01-11

## 2022-01-11 NOTE — TELEPHONE ENCOUNTER
----- Message from Shubham Bhardwaj MD sent at 1/11/2022  1:41 PM EST -----   No thyroid nodules just a cyst which is benign

## 2022-01-24 ENCOUNTER — PREP FOR PROCEDURE (OUTPATIENT)
Dept: VASCULAR SURGERY | Facility: CLINIC | Age: 42
End: 2022-01-24

## 2022-01-24 ENCOUNTER — HOSPITAL ENCOUNTER (OUTPATIENT)
Facility: HOSPITAL | Age: 42
Setting detail: OUTPATIENT SURGERY
End: 2022-01-24
Attending: SURGERY | Admitting: SURGERY
Payer: COMMERCIAL

## 2022-01-24 DIAGNOSIS — I83.813 VARICOSE VEINS OF BOTH LOWER EXTREMITIES WITH PAIN: Primary | ICD-10-CM

## 2022-02-11 ENCOUNTER — LAB (OUTPATIENT)
Dept: LAB | Facility: CLINIC | Age: 42
End: 2022-02-11
Payer: COMMERCIAL

## 2022-02-11 DIAGNOSIS — I83.813 VARICOSE VEINS OF BOTH LOWER EXTREMITIES WITH PAIN: ICD-10-CM

## 2022-02-11 LAB
ANION GAP SERPL CALCULATED.3IONS-SCNC: 5 MMOL/L (ref 4–13)
BUN SERPL-MCNC: 13 MG/DL (ref 5–25)
CALCIUM SERPL-MCNC: 9.4 MG/DL (ref 8.3–10.1)
CHLORIDE SERPL-SCNC: 108 MMOL/L (ref 100–108)
CO2 SERPL-SCNC: 26 MMOL/L (ref 21–32)
CREAT SERPL-MCNC: 0.78 MG/DL (ref 0.6–1.3)
ERYTHROCYTE [DISTWIDTH] IN BLOOD BY AUTOMATED COUNT: 16 % (ref 11.6–15.1)
GFR SERPL CREATININE-BSD FRML MDRD: 93 ML/MIN/1.73SQ M
GLUCOSE P FAST SERPL-MCNC: 81 MG/DL (ref 65–99)
HCT VFR BLD AUTO: 32 % (ref 34.8–46.1)
HGB BLD-MCNC: 8.9 G/DL (ref 11.5–15.4)
MCH RBC QN AUTO: 22.1 PG (ref 26.8–34.3)
MCHC RBC AUTO-ENTMCNC: 27.8 G/DL (ref 31.4–37.4)
MCV RBC AUTO: 80 FL (ref 82–98)
PLATELET # BLD AUTO: 474 THOUSANDS/UL (ref 149–390)
PMV BLD AUTO: 10.7 FL (ref 8.9–12.7)
POTASSIUM SERPL-SCNC: 4.4 MMOL/L (ref 3.5–5.3)
RBC # BLD AUTO: 4.02 MILLION/UL (ref 3.81–5.12)
SODIUM SERPL-SCNC: 139 MMOL/L (ref 136–145)
WBC # BLD AUTO: 8.82 THOUSAND/UL (ref 4.31–10.16)

## 2022-02-11 PROCEDURE — 36415 COLL VENOUS BLD VENIPUNCTURE: CPT

## 2022-02-11 PROCEDURE — 85027 COMPLETE CBC AUTOMATED: CPT

## 2022-02-11 PROCEDURE — 80048 BASIC METABOLIC PNL TOTAL CA: CPT

## 2022-02-15 ENCOUNTER — TELEPHONE (OUTPATIENT)
Dept: INTERNAL MEDICINE CLINIC | Facility: CLINIC | Age: 42
End: 2022-02-15

## 2022-02-15 VITALS — WEIGHT: 169 LBS | HEIGHT: 62 IN | BODY MASS INDEX: 31.1 KG/M2

## 2022-02-15 NOTE — TELEPHONE ENCOUNTER
Having vascular surgery on Mon 2/21 with Dr Chas Gabriel- for her leg    Dr Vibha Hester has to clear her blood levels; since she's anemic    Is it OK for her to have this surgery?     Please give her a call

## 2022-02-15 NOTE — PRE-PROCEDURE INSTRUCTIONS
Pre-Surgery Instructions:   Medication Instructions    norgestimate-ethinyl estradiol (ORTHO TRI-CYCLEN LO) 0 18/0 215/0 25 MG-25 MCG per tablet Instructed to take per normal schedule including DOS with sips water    rosuvastatin (CRESTOR) 40 MG tablet Instructed to take per normal schedule    valACYclovir (VALTREX) 500 mg tablet Instructed to take per normal schedule except DOS    All pre-op instructions reviewed as per [facility] Experience w/ pt verb understanding  Inst NPO post MN night before sx except sips water w/ OCP am of sx  Instructed to avoid all ASA/NSAIDs and OTC Vit/Supp from now until after surgery per anesthesia guidelines  Tylenol ok prn  Received pre-op showering instructions & CHG  from surgeon office, reviewed process at time of call  Current hospital visitor & masking policy reviewed  Emphasized w/ pt that she will be permitted only 1 adult visitor w/ her in preop & postop areas  States her daughter will be waiting outside in the car then and her  will be with her on DOS  Inst will receive phone call w/ time to report on DOS btwn 2-8 pm afternoon/evening before surgery from hospital nursing staff  Pt verbalized an understanding of all instructions reviewed and offers no concerns at this time

## 2022-02-17 ENCOUNTER — OFFICE VISIT (OUTPATIENT)
Dept: DERMATOLOGY | Facility: CLINIC | Age: 42
End: 2022-02-17
Payer: COMMERCIAL

## 2022-02-17 VITALS — WEIGHT: 172 LBS | BODY MASS INDEX: 31.46 KG/M2 | TEMPERATURE: 98.7 F

## 2022-02-17 DIAGNOSIS — L97.919 STASIS LEG ULCER, RIGHT (HCC): Primary | ICD-10-CM

## 2022-02-17 DIAGNOSIS — I83.019 STASIS LEG ULCER, RIGHT (HCC): Primary | ICD-10-CM

## 2022-02-17 PROCEDURE — 99212 OFFICE O/P EST SF 10 MIN: CPT | Performed by: DERMATOLOGY

## 2022-02-17 NOTE — PATIENT INSTRUCTIONS
No further treatment from my point of view except to continue with support stockings and will see her back for her yearly checkup

## 2022-02-17 NOTE — PROGRESS NOTES
[facility] DERMATOLOGY  [address] [place] [address]  Abdirizak Baldwin [address]  [phone]  [phone]     MRN: [id number] : 1980  Encounter: [id number]  Patient Information: Lita Arceo    Subjective:     33-year-old female seen for a quick recheck for stasis ulcer right lower leg area has completely healed patient was supposed to have varicose vein surgery however found to have significant anemia workup is underway     Objective:   Temp 98 7 °F (37 1 °C) (Temporal)   Wt 78 kg (172 lb)   BMI 31 46 kg/m²     Physical Exam:    General Appearance:    Alert, cooperative, no distress   Skin:  Previous site of ulceration completely healed     Assessment:     1  Stasis leg ulcer, right (HCC)           Plan:   No further treatment from my point of view except to continue with support stockings and will see her back for her yearly checkup      Prior to Admission medications    Medication Sig Start Date End Date Taking?  Authorizing Provider   rosuvastatin (CRESTOR) 40 MG tablet Take 1 tablet (40 mg total) by mouth daily 21  Yes Yasir Le MD   valACYclovir (VALTREX) 500 mg tablet Take 1 tablet (500 mg total) by mouth 2 (two) times a day 21 Yes Yasir Le MD   ferrous sulfate 324 (65 Fe) mg Take 1 tablet (324 mg total) by mouth 2 (two) times a day before meals  Patient not taking: Reported on 2022   Yasir Le MD   norgestimate-ethinyl estradiol (ORTHO TRI-CYCLEN LO) 0 18/0 215/0 25 MG-25 MCG per tablet Take 1 tablet by mouth daily   9/23/21 2/15/22  Historical Provider, MD     Allergies   Allergen Reactions    Pollen Extract Sneezing and Nasal Congestion     Depending on the season       Maryann Goode MD  2022,9:04 AM    Portions of the record may have been created with voice recognition software   Occasional wrong word or "sound a like" substitutions may have occurred due to the inherent limitations of voice recognition software   Read the chart carefully and recognize, using context, where substitutions have occurred

## 2022-02-21 ENCOUNTER — HOSPITAL ENCOUNTER (OUTPATIENT)
Dept: VASCULAR ULTRASOUND | Facility: HOSPITAL | Age: 42
End: 2022-02-21
Payer: COMMERCIAL

## 2022-04-25 ENCOUNTER — TELEPHONE (OUTPATIENT)
Dept: INTERNAL MEDICINE CLINIC | Facility: CLINIC | Age: 42
End: 2022-04-25

## 2022-04-25 NOTE — TELEPHONE ENCOUNTER
Ferrous sulfate for Iron very difficult on her stomach  Is there something else or different dose?   Call pt [phone]

## 2022-04-26 NOTE — TELEPHONE ENCOUNTER
Try ferrous gluconate once a day to start for a week, then twice a day    I will send a small amount to the pharmacy

## 2022-06-22 ENCOUNTER — OFFICE VISIT (OUTPATIENT)
Dept: INTERNAL MEDICINE CLINIC | Facility: CLINIC | Age: 42
End: 2022-06-22
Payer: COMMERCIAL

## 2022-06-22 VITALS
DIASTOLIC BLOOD PRESSURE: 90 MMHG | OXYGEN SATURATION: 98 % | BODY MASS INDEX: 31.28 KG/M2 | WEIGHT: 170 LBS | TEMPERATURE: 99 F | HEART RATE: 88 BPM | SYSTOLIC BLOOD PRESSURE: 132 MMHG | HEIGHT: 62 IN

## 2022-06-22 DIAGNOSIS — Z00.00 HEALTHCARE MAINTENANCE: ICD-10-CM

## 2022-06-22 DIAGNOSIS — E31.20: ICD-10-CM

## 2022-06-22 DIAGNOSIS — E78.2 MIXED HYPERLIPIDEMIA: Primary | ICD-10-CM

## 2022-06-22 PROCEDURE — 99214 OFFICE O/P EST MOD 30 MIN: CPT | Performed by: INTERNAL MEDICINE

## 2022-06-22 RX ORDER — ROSUVASTATIN CALCIUM 40 MG/1
40 TABLET, COATED ORAL DAILY
Qty: 90 TABLET | Refills: 3 | Status: SHIPPED | OUTPATIENT
Start: 2022-06-22

## 2022-06-22 NOTE — PATIENT INSTRUCTIONS
A 38year-old who feels well but who needs follow-up on some issues  Iron deficiency:  Needs recheck of iron levels and CBC  Also Hemoccult  Working diagnosis is excessive menstrual bleeding but we need to be careful     Prior history of ependymoma  I would like to see repeat brain CT  Parathyroid adenoma history at a very young age: This makes her a multiple endocrine neoplasia suspect and I would like to see the brain MR, and a CT of the abdomen to examine the pancreas    Recent thyroid ultrasound showed a benign cyst

## 2022-06-22 NOTE — PROGRESS NOTES
Assessment/Plan:       Diagnoses and all orders for this visit:    Mixed hyperlipidemia  -     rosuvastatin (CRESTOR) 40 MG tablet; Take 1 tablet (40 mg total) by mouth daily    Multiple endocrine neoplasia (MEN) ([facility] )  -     CT abdomen w contrast; Future  -     MRI brain pituitary wo contrast; Future    Healthcare maintenance  -     Lipid Panel with Direct LDL reflex; Future  -     CBC and differential; Future  -     Comprehensive metabolic panel; Future  -     Hemoglobin A1C; Future  -     TSH, 3rd generation with Free T4 reflex; Future  -     UA (URINE) with reflex to Scope  -     PTH, intact; Future  -     Iron; Future  -     Occult Blood, Fecal Immunochemical; Future  -     Transferrin; Future                Subjective:      Patient ID: Abdoulaye Reed is a 38 y o  female  A young woman with too many health issues although fortunately she seems to have nothing physically acute  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   Parathyroid adenoma about 4 years ago which presented is asymptomatic hypercalcemia   Given how young she was, 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  Venous insufficiency secondary to varicose vein  Specifically, both saphenous veins  Right now, the patient is planning to get a procedure to alleviate this issue through a vascular surgeon   Fibroid uterus identified about 1 year ago  Gynecology follow-up is scheduled next week  Current issues principally of anxious depression  Her  is bipolar and there marriage is always troublesome  No cigarettes, rare alcohol, no illicit drugs  Not currently working outside the house   Her only physical complaint is that of feeling fatigue and tiredness; I believe it is most likely due to her anxious depression but we will have to screen her for any laboratory anomalies as her physical exam is normal     Notes ridges on her nails        Frequent outbreaks of herpes labialis 1 which she treats with Valtrex  Slight goiter on exam        The following portions of the patient's history were reviewed and updated as appropriate:   She has a past medical history of Benign neoplasm of skin, Brain cancer ([address] ), Environmental and seasonal allergies, Hyperlipidemia, Hyperparathyroidism, primary ([address] ), Menorrhagia, Thyroid cyst, and Varicose vein of leg ,  does not have any pertinent problems on file  ,   has a past surgical history that includes  section; Parathyroidectomy; Brain surgery; and Foot surgery (Left)  ,  family history includes Alcohol abuse in her father; COPD in her mother; Cirrhosis in her father; Colon cancer in her maternal grandmother; Diabetes in her father and mother; Heart disease in her father and mother; Hyperlipidemia in her mother; Hypertension in her father and mother; Ovarian cancer in her maternal grandmother; Stroke in her mother; Thyroid disease in her sister  ,   reports that she quit smoking about 21 years ago  Her smoking use included cigarettes  She has a 7 50 pack-year smoking history  She has never used smokeless tobacco  She reports current alcohol use  She reports that she does not use drugs  ,  is allergic to pollen extract     Current Outpatient Medications   Medication Sig Dispense Refill    ferrous gluconate (FERGON) 240 (27 FE) MG tablet Take 1 tablet (240 mg total) by mouth 2 (two) times a day 60 tablet 3    norgestimate-ethinyl estradiol (ORTHO TRI-CYCLEN LO) 0 18/0 215/0 25 MG-25 MCG per tablet Take 1 tablet by mouth daily        rosuvastatin (CRESTOR) 40 MG tablet Take 1 tablet (40 mg total) by mouth daily 90 tablet 3    valACYclovir (VALTREX) 500 mg tablet Take 1 tablet (500 mg total) by mouth 2 (two) times a day 60 tablet 11     No current facility-administered medications for this visit  Review of Systems   Constitutional: Positive for fatigue  All other systems reviewed and are negative          Objective:  Vitals:    22 0801 BP: 132/90   Pulse: 88   Temp: 99 °F (37 2 °C)   SpO2: 98%      Physical Exam  Eyes:      General: No scleral icterus  Cardiovascular:      Rate and Rhythm: Normal rate  Pulmonary:      Effort: Pulmonary effort is normal    Abdominal:      Palpations: Abdomen is soft  Musculoskeletal:         General: Normal range of motion  Neurological:      General: No focal deficit present  Mental Status: She is alert and oriented to person, place, and time  Psychiatric:         Mood and Affect: Mood normal            Patient Instructions   A 55-year-old who feels well but who needs follow-up on some issues  Iron deficiency:  Needs recheck of iron levels and CBC  Also Hemoccult  Working diagnosis is excessive menstrual bleeding but we need to be careful     Prior history of ependymoma  I would like to see repeat brain CT  Parathyroid adenoma history at a very young age: This makes her a multiple endocrine neoplasia suspect and I would like to see the brain MR, and a CT of the abdomen to examine the pancreas    Recent thyroid ultrasound showed a benign cyst

## 2022-07-18 ENCOUNTER — TELEPHONE (OUTPATIENT)
Dept: INTERNAL MEDICINE CLINIC | Facility: CLINIC | Age: 42
End: 2022-07-18

## 2022-07-18 NOTE — TELEPHONE ENCOUNTER
PT made appts for Ct and MRI for Sept 9th based on referrals from Dr Marco Rojas    PT has state Ins ( [facility]) and requires a Pre Auth  Please call pt when this is done    [phone]

## 2022-07-21 NOTE — TELEPHONE ENCOUNTER
A message was sent to the pre-cert team to handle the auths for CT and MRI       They'll be working on it to [facility]- as it gets closer to the appts

## 2022-08-02 DIAGNOSIS — E31.20: Primary | ICD-10-CM

## 2022-09-06 ENCOUNTER — TELEPHONE (OUTPATIENT)
Dept: INTERNAL MEDICINE CLINIC | Facility: CLINIC | Age: 42
End: 2022-09-06

## 2022-09-06 NOTE — TELEPHONE ENCOUNTER
Message for Dr Vibha Hester: The CT Abd wasn't approved  [facility] with [facility] is looking for prior imaging to be done 1st- before they'll approve the CT Abd    Such as: ultra sound or a most recent plain film x-ray    Are you going to order an ultra sound or plain film x-ray? A peer to peer won't help approve this- I know you aren't back in the office until 9/12  Please let me know your decision     Pt is scheduled 9/9, Friday  I'll have to inform the pt to cancel the CT for now

## 2022-09-07 DIAGNOSIS — E31.20: Primary | ICD-10-CM

## 2022-09-07 NOTE — TELEPHONE ENCOUNTER
I called pt and told her to cancel her CT Abdomen- since we don't have an approval      I told her that Dr Shanon Butler did order the ultra sound of the abdomen- that has to be done 1st- in order to get an approval for the CT      Pt will schedule the ultra sound- she was driving and couldn't take the Central Scheduling p#

## 2022-09-09 ENCOUNTER — HOSPITAL ENCOUNTER (OUTPATIENT)
Dept: CT IMAGING | Facility: CLINIC | Age: 42
End: 2022-09-09
Payer: COMMERCIAL

## 2022-09-09 ENCOUNTER — HOSPITAL ENCOUNTER (OUTPATIENT)
Dept: MRI IMAGING | Facility: CLINIC | Age: 42
Discharge: HOME | End: 2022-09-09
Payer: COMMERCIAL

## 2022-09-09 DIAGNOSIS — E31.20: ICD-10-CM

## 2022-09-09 PROCEDURE — 70551 MRI BRAIN STEM W/O DYE: CPT

## 2022-09-09 PROCEDURE — G1004 CDSM NDSC: HCPCS

## 2022-09-13 ENCOUNTER — TELEPHONE (OUTPATIENT)
Dept: INTERNAL MEDICINE CLINIC | Facility: CLINIC | Age: 42
End: 2022-09-13

## 2022-09-13 NOTE — TELEPHONE ENCOUNTER
----- Message from Augie Keller MD sent at 9/13/2022  2:05 PM EDT -----  No change in brain MRI shows residue of prior tumor but nothing no

## 2022-10-12 PROBLEM — J32.9 RHINOSINUSITIS: Status: RESOLVED | Noted: 2021-08-18 | Resolved: 2022-10-12

## 2022-10-12 PROBLEM — J31.0 RHINOSINUSITIS: Status: RESOLVED | Noted: 2021-08-18 | Resolved: 2022-10-12

## 2022-11-30 DIAGNOSIS — B00.9 HERPES SIMPLEX: ICD-10-CM

## 2022-11-30 RX ORDER — VALACYCLOVIR HYDROCHLORIDE 500 MG/1
TABLET, FILM COATED ORAL
Qty: 60 TABLET | Refills: 11 | Status: SHIPPED | OUTPATIENT
Start: 2022-11-30 | End: 2023-01-29

## 2022-12-12 ENCOUNTER — OFFICE VISIT (OUTPATIENT)
Dept: INTERNAL MEDICINE CLINIC | Facility: CLINIC | Age: 42
End: 2022-12-12

## 2022-12-12 ENCOUNTER — APPOINTMENT (OUTPATIENT)
Dept: LAB | Facility: CLINIC | Age: 42
End: 2022-12-12

## 2022-12-12 VITALS
RESPIRATION RATE: 16 BRPM | BODY MASS INDEX: 32.65 KG/M2 | DIASTOLIC BLOOD PRESSURE: 80 MMHG | SYSTOLIC BLOOD PRESSURE: 122 MMHG | HEIGHT: 62 IN | WEIGHT: 177.4 LBS | OXYGEN SATURATION: 99 % | TEMPERATURE: 99.3 F | HEART RATE: 84 BPM

## 2022-12-12 DIAGNOSIS — Z00.00 HEALTHCARE MAINTENANCE: ICD-10-CM

## 2022-12-12 DIAGNOSIS — Z12.31 ENCOUNTER FOR SCREENING MAMMOGRAM FOR BREAST CANCER: Primary | ICD-10-CM

## 2022-12-12 DIAGNOSIS — B00.9 HERPES SIMPLEX: ICD-10-CM

## 2022-12-12 LAB
ALBUMIN SERPL BCP-MCNC: 3.1 G/DL (ref 3.5–5)
ALP SERPL-CCNC: 55 U/L (ref 46–116)
ALT SERPL W P-5'-P-CCNC: 22 U/L (ref 12–78)
ANION GAP SERPL CALCULATED.3IONS-SCNC: 7 MMOL/L (ref 4–13)
AST SERPL W P-5'-P-CCNC: 14 U/L (ref 5–45)
BACTERIA UR QL AUTO: ABNORMAL /HPF
BASOPHILS # BLD AUTO: 0.07 THOUSANDS/ÂΜL (ref 0–0.1)
BASOPHILS NFR BLD AUTO: 1 % (ref 0–1)
BILIRUB SERPL-MCNC: 0.41 MG/DL (ref 0.2–1)
BILIRUB UR QL STRIP: NEGATIVE
BUN SERPL-MCNC: 12 MG/DL (ref 5–25)
CALCIUM ALBUM COR SERPL-MCNC: 9.7 MG/DL (ref 8.3–10.1)
CALCIUM SERPL-MCNC: 9 MG/DL (ref 8.3–10.1)
CHLORIDE SERPL-SCNC: 108 MMOL/L (ref 96–108)
CHOLEST SERPL-MCNC: 144 MG/DL
CLARITY UR: CLEAR
CO2 SERPL-SCNC: 23 MMOL/L (ref 21–32)
COLOR UR: YELLOW
CREAT SERPL-MCNC: 0.77 MG/DL (ref 0.6–1.3)
EOSINOPHIL # BLD AUTO: 0.21 THOUSAND/ÂΜL (ref 0–0.61)
EOSINOPHIL NFR BLD AUTO: 2 % (ref 0–6)
ERYTHROCYTE [DISTWIDTH] IN BLOOD BY AUTOMATED COUNT: 14.9 % (ref 11.6–15.1)
EST. AVERAGE GLUCOSE BLD GHB EST-MCNC: 100 MG/DL
FOLATE SERPL-MCNC: >20 NG/ML (ref 3.1–17.5)
GFR SERPL CREATININE-BSD FRML MDRD: 94 ML/MIN/1.73SQ M
GLUCOSE P FAST SERPL-MCNC: 82 MG/DL (ref 65–99)
GLUCOSE UR STRIP-MCNC: NEGATIVE MG/DL
HBA1C MFR BLD: 5.1 %
HCT VFR BLD AUTO: 37.2 % (ref 34.8–46.1)
HDLC SERPL-MCNC: 44 MG/DL
HGB BLD-MCNC: 10.8 G/DL (ref 11.5–15.4)
HGB UR QL STRIP.AUTO: ABNORMAL
IMM GRANULOCYTES # BLD AUTO: 0.03 THOUSAND/UL (ref 0–0.2)
IMM GRANULOCYTES NFR BLD AUTO: 0 % (ref 0–2)
IRON SATN MFR SERPL: 26 % (ref 15–50)
IRON SERPL-MCNC: 158 UG/DL (ref 50–170)
KETONES UR STRIP-MCNC: NEGATIVE MG/DL
LDLC SERPL CALC-MCNC: 47 MG/DL (ref 0–100)
LEUKOCYTE ESTERASE UR QL STRIP: NEGATIVE
LYMPHOCYTES # BLD AUTO: 2.2 THOUSANDS/ÂΜL (ref 0.6–4.47)
LYMPHOCYTES NFR BLD AUTO: 22 % (ref 14–44)
MCH RBC QN AUTO: 25.5 PG (ref 26.8–34.3)
MCHC RBC AUTO-ENTMCNC: 29 G/DL (ref 31.4–37.4)
MCV RBC AUTO: 88 FL (ref 82–98)
MONOCYTES # BLD AUTO: 0.63 THOUSAND/ÂΜL (ref 0.17–1.22)
MONOCYTES NFR BLD AUTO: 6 % (ref 4–12)
MUCOUS THREADS UR QL AUTO: ABNORMAL
NEUTROPHILS # BLD AUTO: 6.94 THOUSANDS/ÂΜL (ref 1.85–7.62)
NEUTS SEG NFR BLD AUTO: 69 % (ref 43–75)
NITRITE UR QL STRIP: NEGATIVE
NON-SQ EPI CELLS URNS QL MICRO: ABNORMAL /HPF
NRBC BLD AUTO-RTO: 0 /100 WBCS
PH UR STRIP.AUTO: 6.5 [PH]
PLATELET # BLD AUTO: 425 THOUSANDS/UL (ref 149–390)
PMV BLD AUTO: 10.7 FL (ref 8.9–12.7)
POTASSIUM SERPL-SCNC: 3.8 MMOL/L (ref 3.5–5.3)
PROT SERPL-MCNC: 6.5 G/DL (ref 6.4–8.4)
PROT UR STRIP-MCNC: ABNORMAL MG/DL
RBC # BLD AUTO: 4.23 MILLION/UL (ref 3.81–5.12)
RBC #/AREA URNS AUTO: ABNORMAL /HPF
SODIUM SERPL-SCNC: 138 MMOL/L (ref 135–147)
SP GR UR STRIP.AUTO: 1.02 (ref 1–1.03)
TIBC SERPL-MCNC: 605 UG/DL (ref 250–450)
TRANSFERRIN SERPL-MCNC: 522 MG/DL (ref 200–400)
TRIGL SERPL-MCNC: 266 MG/DL
TSH SERPL DL<=0.05 MIU/L-ACNC: 1.56 UIU/ML (ref 0.45–4.5)
UROBILINOGEN UR STRIP-ACNC: <2 MG/DL
VIT B12 SERPL-MCNC: 258 PG/ML (ref 100–900)
WBC # BLD AUTO: 10.08 THOUSAND/UL (ref 4.31–10.16)
WBC #/AREA URNS AUTO: ABNORMAL /HPF

## 2022-12-12 NOTE — PROGRESS NOTES
Assessment/Plan:       Diagnoses and all orders for this visit:    Encounter for screening mammogram for breast cancer  -     Mammo screening bilateral w 3d & cad; Future    Herpes simplex    Healthcare maintenance  -     Lipid Panel with Direct LDL reflex; Future  -     CBC and differential; Future  -     Hemoglobin A1C; Future  -     Comprehensive metabolic panel; Future  -     TSH, 3rd generation with Free T4 reflex; Future  -     Cancel: UA (URINE) with reflex to Scope  -     Folate; Future  -     Iron; Future  -     Iron Saturation %; Future  -     Transferrin; Future  -     Vitamin B12; Future                Subjective:      Patient ID: Salomon Recinos is a 38 y o  female  A young woman with too many health issues although fortunately she seems to have nothing physically acute  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   Parathyroid adenoma about 4 years ago which presented is asymptomatic hypercalcemia   Given how young she was, 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  Pituitary MRI was normal but of course these  things do not have to be synchronous  Venous insufficiency secondary to varicose vein  Specifically, both saphenous veins  Right now, the patient is planning to get a procedure to alleviate this issue through a vascular surgeon   Fibroid uterus identified about 1 year ago  Gynecology follow-up is scheduled next week  Current issues principally of anxious depression  Her  is bipolar and there marriage is always troublesome  No cigarettes, rare alcohol, no illicit drugs  Not currently working outside the house   Her only physical complaint is that of feeling fatigue and tiredness; I believe it is most likely due to her anxious depression but we will have to screen her for any laboratory anomalies as her physical exam is normal     Notes ridges on her nails        Frequent outbreaks of herpes labialis 1 which she treats with Valtrex  Slight goiter on exam        The following portions of the patient's history were reviewed and updated as appropriate:   She has a past medical history of Benign neoplasm of skin, Brain cancer ([address] ), Environmental and seasonal allergies, Hyperlipidemia, Hyperparathyroidism, primary ([address] ), Menorrhagia, Thyroid cyst, and Varicose vein of leg ,  does not have any pertinent problems on file  ,   has a past surgical history that includes  section; Parathyroidectomy; Brain surgery; and Foot surgery (Left)  ,  family history includes Alcohol abuse in her father; COPD in her mother; Cirrhosis in her father; Colon cancer in her maternal grandmother; Diabetes in her father and mother; Heart disease in her father and mother; Hyperlipidemia in her mother; Hypertension in her father and mother; Ovarian cancer in her maternal grandmother; Stroke in her mother; Thyroid disease in her sister  ,   reports that she has never smoked  She has never used smokeless tobacco  She reports current alcohol use  She reports that she does not use drugs  ,  is allergic to pollen extract     Current Outpatient Medications   Medication Sig Dispense Refill   • ferrous gluconate (FERGON) 240 (27 FE) MG tablet Take 1 tablet (240 mg total) by mouth 2 (two) times a day 60 tablet 3   • rosuvastatin (CRESTOR) 40 MG tablet Take 1 tablet (40 mg total) by mouth daily 90 tablet 3   • valACYclovir (VALTREX) 500 mg tablet TAKE 1 TABLET BY MOUTH TWICE A DAY 60 tablet 11   • norgestimate-ethinyl estradiol (ORTHO TRI-CYCLEN LO) 0 18/0 215/0 25 MG-25 MCG per tablet Take 1 tablet by mouth daily         No current facility-administered medications for this visit  Review of Systems   Cardiovascular: Positive for leg swelling  Psychiatric/Behavioral: The patient is nervous/anxious            Objective:  Vitals:    22 0818   BP: 122/80   Pulse: 84   Resp: 16   Temp: 99 3 °F (37 4 °C)   SpO2: 99%      Physical Exam  Constitutional: Appearance: Normal appearance  Cardiovascular:      Rate and Rhythm: Normal rate  Pulses: Normal pulses  Pulmonary:      Effort: Pulmonary effort is normal       Breath sounds: Normal breath sounds  Neurological:      General: No focal deficit present  Mental Status: She is alert  Psychiatric:         Mood and Affect: Mood normal          Judgment: Judgment normal            Patient Instructions   Multiple issues but stable  6-month follow-up

## 2023-02-05 DIAGNOSIS — D50.0 IRON DEFICIENCY ANEMIA DUE TO CHRONIC BLOOD LOSS: ICD-10-CM

## 2023-02-05 RX ORDER — FERROUS GLUCONATE 270(27)MG
TABLET ORAL
Qty: 60 TABLET | Refills: 3 | Status: SHIPPED | OUTPATIENT
Start: 2023-02-05

## 2023-02-23 ENCOUNTER — OFFICE VISIT (OUTPATIENT)
Dept: DERMATOLOGY | Facility: CLINIC | Age: 43
End: 2023-02-23

## 2023-02-23 VITALS — BODY MASS INDEX: 32.57 KG/M2 | HEIGHT: 62 IN | WEIGHT: 177 LBS

## 2023-02-23 DIAGNOSIS — D22.9 NEVUS: Primary | ICD-10-CM

## 2023-02-23 DIAGNOSIS — I87.2 STASIS DERMATITIS OF BOTH LEGS: ICD-10-CM

## 2023-02-23 DIAGNOSIS — Z13.89 SCREENING FOR SKIN CONDITION: ICD-10-CM

## 2023-02-23 NOTE — PROGRESS NOTES
[facility] DERMATOLOGY  [address] [place] [id number]  [phone]  [phone]     MRN: [id number] : 1980  Encounter: [id number]  Patient Information: Irma Altamirano  Chief complaint: Yearly checkup    History of present illness: 42-year-old female presents for overall skin check history of stasis ulceration treated last year been healed patient has kept the area closed with use of support stockings  Past Medical History:   Diagnosis Date   • Benign neoplasm of skin    • Brain cancer ([facility] )     pt states benign tumor    • Environmental and seasonal allergies    • Hyperlipidemia    • Hyperparathyroidism, primary ([facility] )     last assessed 2015   • Menorrhagia    • Thyroid cyst    • Varicose vein of leg      Past Surgical History:   Procedure Laterality Date   •  Drive      Neurosurgery for brain benign tumor per pt report;  last assessed 2014   •  SECTION     • FOOT SURGERY Left    • PARATHYROIDECTOMY       Social History   Social History     Substance and Sexual Activity   Alcohol Use Yes    Comment:  one drink every 2 months     Social History     Substance and Sexual Activity   Drug Use No     Social History     Tobacco Use   Smoking Status Never   Smokeless Tobacco Never     Family History   Problem Relation Age of Onset   • Diabetes Mother    • Heart disease Mother    • COPD Mother    • Hypertension Mother    • Hyperlipidemia Mother    • Stroke Mother    • Diabetes Father    • Heart disease Father    • Alcohol abuse Father    • Cirrhosis Father    • Hypertension Father    • Colon cancer Maternal Grandmother    • Ovarian cancer Maternal Grandmother    • Thyroid disease Sister      Meds/Allergies   Allergies   Allergen Reactions   • Pollen Extract Sneezing and Nasal Congestion     Depending on the season       Meds:  Prior to Admission medications    Medication Sig Start Date End Date Taking?  Authorizing Provider   [place] () MG tablet TAKE 1 TABLET BY MOUTH 2 TIMES A DAY  2/5/23  Yes Andrew Richardson MD   norgestimate-ethinyl estradiol (ORTHO TRI-CYCLEN LO) 0 18/0 215/0 25 MG-25 MCG per tablet Take 1 tablet by mouth daily   9/23/21 2/23/23 Yes Historical Provider, MD   rosuvastatin (CRESTOR) 40 MG tablet Take 1 tablet (40 mg total) by mouth daily 6/22/22  Yes Andrew Richardson MD   valACYclovir (VALTREX) 500 mg tablet TAKE 1 TABLET BY MOUTH TWICE A DAY 11/30/22 1/29/23  Andrew Richardson MD       Subjective:     Review of Systems:    General: negative for - chills, fatigue, fever,  weight gain or weight loss  Psychological: negative for - anxiety, behavioral disorder, concentration difficulties, decreased libido, depression, irritability, memory difficulties, mood swings, sleep disturbances or suicidal ideation  ENT: negative for - hearing difficulties , nasal congestion, nasal discharge, oral lesions, sinus pain, sneezing, sore throat  Allergy and Immunology: negative for - hives, insect bite sensitivity,  Hematological and Lymphatic: negative for - bleeding problems, blood clots,bruising, swollen lymph nodes  Endocrine: negative for - hair pattern changes, hot flashes, malaise/lethargy, mood swings, palpitations, polydipsia/polyuria, skin changes, temperature intolerance or unexpected weight change  Respiratory: negative for - cough, hemoptysis, orthopnea, shortness of breath, or wheezing  Cardiovascular: negative for - chest pain, dyspnea on exertion, edema,  Gastrointestinal: negative for - abdominal pain, nausea/vomiting  Genito-Urinary: negative for - dysuria, incontinence, irregular/heavy menses or urinary frequency/urgency  Musculoskeletal: negative for - gait disturbance, joint pain, joint stiffness, joint swelling, muscle pain, muscular weakness  Dermatological:  As in HPI  Neurological: negative for confusion, dizziness, headaches, impaired coordination/balance, memory loss, numbness/tingling, seizures, speech problems, tremors or weakness       Objective:   Ht 5' 2 25" (1 581 m)   Wt 80 3 kg (177 lb)   BMI 32 11 kg/m²     Physical Exam:    General Appearance:    Alert, cooperative, no distress   Head:    Normocephalic, without obvious abnormality, atraumatic           Skin:   A full skin exam was performed including scalp, head scalp, eyes, ears, nose, lips, neck, chest, axilla, abdomen, back, buttocks, bilateral upper extremities, bilateral lower extremities, hands, feet, fingers, toes, fingernails, and toenails pigmented lesions regular shape and color nothing else remarkable on exam varicosities noted as previous without any signs of any ulceration     Assessment:     1  Nevus        2  Screening for skin condition        3  Stasis dermatitis of both legs              Plan:   Nevi reviewed the concept of ABCDE and ugly duckling nothing markedly atypical patient reassured  Stasis dermatitis under control continue with support stockings  Screening for Dermatologic Disorders: Nothing else of concern noted on complete exam follow up in 2 year       Francesca Green MD  2/23/2023,9:08 AM    Portions of the record may have been created with voice recognition software   Occasional wrong word or "sound a like" substitutions may have occurred due to the inherent limitations of voice recognition software   Read the chart carefully and recognize, using context, where substitutions have occurred

## 2023-02-23 NOTE — PATIENT INSTRUCTIONS
Munira reviewed the concept of ABCDE and ugly duckling nothing markedly atypical patient reassured  Stasis dermatitis under control continue with support stockings  Screening for Dermatologic Disorders: Nothing else of concern noted on complete exam follow up in 2 year

## 2023-02-23 NOTE — PROGRESS NOTES
[facility] Note     Patient Name: Ruy Prabhakar  Encounter Date: February 23, 2023     Have you been cared for by a [place] [address] Dermatologist in the last 3 years and, if so, which description applies to you? Yes  I have been here within the last 3 years, and my medical history has NOT changed since that time  I am FEMALE/of child-bearing potential     REVIEW OF SYSTEMS:  Have you recently had or currently have any of the following? · No changes in my recent health  PAST MEDICAL HISTORY:  Have you personally ever had or currently have any of the following? If "YES," then please provide more detail  · No changes in my medical history  FAMILY HISTORY:  Any "first degree relatives" (parent, brother, sister, or child) with the following? • No changes in my family's known health  PATIENT EXPERIENCE:    • Do you want the Dermatologist to perform a COMPLETE skin exam today including a clinical examination under the "bra and underwear" areas? Yes  • If necessary, do we have your permission to call and leave a detailed message on your Preferred Phone number that includes your specific medical information?   Yes      Allergies   Allergen Reactions   • Pollen Extract Sneezing and Nasal Congestion     Depending on the season      Current Outpatient Medications:   •  Fergon 240 (27 Fe) MG tablet, TAKE 1 TABLET BY MOUTH 2 TIMES A DAY , Disp: 60 tablet, Rfl: 3  •  norgestimate-ethinyl estradiol (ORTHO TRI-CYCLEN LO) 0 18/0 215/0 25 MG-25 MCG per tablet, Take 1 tablet by mouth daily  , Disp: , Rfl:   •  rosuvastatin (CRESTOR) 40 MG tablet, Take 1 tablet (40 mg total) by mouth daily, Disp: 90 tablet, Rfl: 3  •  valACYclovir (VALTREX) 500 mg tablet, TAKE 1 TABLET BY MOUTH TWICE A DAY, Disp: 60 tablet, Rfl: 11          • Whom besides the patient is providing clinical information about today's encounter?   o NO ADDITIONAL HISTORIAN (patient alone provided history)    Physical Exam and Assessment/Plan by Diagnosis:

## 2023-03-14 ENCOUNTER — TELEPHONE (OUTPATIENT)
Dept: INTERNAL MEDICINE CLINIC | Facility: CLINIC | Age: 43
End: 2023-03-14

## 2023-03-14 DIAGNOSIS — E78.2 MIXED HYPERLIPIDEMIA: ICD-10-CM

## 2023-03-14 DIAGNOSIS — D50.0 IRON DEFICIENCY ANEMIA DUE TO CHRONIC BLOOD LOSS: ICD-10-CM

## 2023-03-14 RX ORDER — QUINIDINE GLUCONATE 324 MG
1 TABLET, EXTENDED RELEASE ORAL 2 TIMES DAILY
Qty: 60 TABLET | Refills: 3 | Status: SHIPPED | OUTPATIENT
Start: 2023-03-14

## 2023-03-14 RX ORDER — ROSUVASTATIN CALCIUM 40 MG/1
40 TABLET, COATED ORAL DAILY
Qty: 90 TABLET | Refills: 3 | Status: SHIPPED | OUTPATIENT
Start: 2023-03-14

## 2023-06-14 ENCOUNTER — APPOINTMENT (OUTPATIENT)
Age: 43
End: 2023-06-14
Payer: COMMERCIAL

## 2023-06-14 ENCOUNTER — OFFICE VISIT (OUTPATIENT)
Age: 43
End: 2023-06-14
Payer: COMMERCIAL

## 2023-06-14 VITALS
WEIGHT: 176.6 LBS | TEMPERATURE: 97.6 F | HEART RATE: 79 BPM | OXYGEN SATURATION: 100 % | BODY MASS INDEX: 32.5 KG/M2 | SYSTOLIC BLOOD PRESSURE: 126 MMHG | HEIGHT: 62 IN | DIASTOLIC BLOOD PRESSURE: 76 MMHG

## 2023-06-14 DIAGNOSIS — E78.2 MIXED HYPERLIPIDEMIA: ICD-10-CM

## 2023-06-14 DIAGNOSIS — B00.9 HERPES SIMPLEX: ICD-10-CM

## 2023-06-14 DIAGNOSIS — Z12.11 COLON CANCER SCREENING: ICD-10-CM

## 2023-06-14 DIAGNOSIS — G62.9 NEUROPATHY: ICD-10-CM

## 2023-06-14 DIAGNOSIS — G62.9 NEUROPATHY: Primary | ICD-10-CM

## 2023-06-14 DIAGNOSIS — D50.0 IRON DEFICIENCY ANEMIA DUE TO CHRONIC BLOOD LOSS: ICD-10-CM

## 2023-06-14 PROBLEM — Z00.00 HEALTHCARE MAINTENANCE: Status: ACTIVE | Noted: 2023-06-14

## 2023-06-14 LAB
ALBUMIN SERPL BCP-MCNC: 3.4 G/DL (ref 3.5–5)
ALP SERPL-CCNC: 54 U/L (ref 46–116)
ALT SERPL W P-5'-P-CCNC: 25 U/L (ref 12–78)
ANION GAP SERPL CALCULATED.3IONS-SCNC: 6 MMOL/L (ref 4–13)
AST SERPL W P-5'-P-CCNC: 16 U/L (ref 5–45)
BASOPHILS # BLD AUTO: 0.08 THOUSANDS/ÂΜL (ref 0–0.1)
BASOPHILS NFR BLD AUTO: 1 % (ref 0–1)
BILIRUB SERPL-MCNC: 0.55 MG/DL (ref 0.2–1)
BUN SERPL-MCNC: 11 MG/DL (ref 5–25)
CALCIUM ALBUM COR SERPL-MCNC: 10 MG/DL (ref 8.3–10.1)
CALCIUM SERPL-MCNC: 9.5 MG/DL (ref 8.3–10.1)
CHLORIDE SERPL-SCNC: 109 MMOL/L (ref 96–108)
CHOLEST SERPL-MCNC: 164 MG/DL
CO2 SERPL-SCNC: 24 MMOL/L (ref 21–32)
CORTIS AM PEAK SERPL-MCNC: 15.1 UG/DL (ref 6.7–22.6)
CREAT SERPL-MCNC: 0.86 MG/DL (ref 0.6–1.3)
EOSINOPHIL # BLD AUTO: 0.22 THOUSAND/ÂΜL (ref 0–0.61)
EOSINOPHIL NFR BLD AUTO: 3 % (ref 0–6)
ERYTHROCYTE [DISTWIDTH] IN BLOOD BY AUTOMATED COUNT: 14.7 % (ref 11.6–15.1)
FOLATE SERPL-MCNC: 19.2 NG/ML
GFR SERPL CREATININE-BSD FRML MDRD: 82 ML/MIN/1.73SQ M
GLUCOSE P FAST SERPL-MCNC: 81 MG/DL (ref 65–99)
HCT VFR BLD AUTO: 39.3 % (ref 34.8–46.1)
HDLC SERPL-MCNC: 48 MG/DL
HGB BLD-MCNC: 12 G/DL (ref 11.5–15.4)
IMM GRANULOCYTES # BLD AUTO: 0.03 THOUSAND/UL (ref 0–0.2)
IMM GRANULOCYTES NFR BLD AUTO: 0 % (ref 0–2)
IRON SERPL-MCNC: 45 UG/DL (ref 50–170)
LDLC SERPL CALC-MCNC: 61 MG/DL (ref 0–100)
LYMPHOCYTES # BLD AUTO: 2 THOUSANDS/ÂΜL (ref 0.6–4.47)
LYMPHOCYTES NFR BLD AUTO: 23 % (ref 14–44)
MCH RBC QN AUTO: 27.9 PG (ref 26.8–34.3)
MCHC RBC AUTO-ENTMCNC: 30.5 G/DL (ref 31.4–37.4)
MCV RBC AUTO: 91 FL (ref 82–98)
MONOCYTES # BLD AUTO: 0.62 THOUSAND/ÂΜL (ref 0.17–1.22)
MONOCYTES NFR BLD AUTO: 7 % (ref 4–12)
NEUTROPHILS # BLD AUTO: 5.81 THOUSANDS/ÂΜL (ref 1.85–7.62)
NEUTS SEG NFR BLD AUTO: 66 % (ref 43–75)
NRBC BLD AUTO-RTO: 0 /100 WBCS
PLATELET # BLD AUTO: 404 THOUSANDS/UL (ref 149–390)
PMV BLD AUTO: 11.1 FL (ref 8.9–12.7)
POTASSIUM SERPL-SCNC: 4 MMOL/L (ref 3.5–5.3)
PROT SERPL-MCNC: 7 G/DL (ref 6.4–8.4)
RBC # BLD AUTO: 4.3 MILLION/UL (ref 3.81–5.12)
SODIUM SERPL-SCNC: 139 MMOL/L (ref 135–147)
TRANSFERRIN SERPL-MCNC: 417 MG/DL (ref 200–400)
TRIGL SERPL-MCNC: 277 MG/DL
TSH SERPL DL<=0.05 MIU/L-ACNC: 2.01 UIU/ML (ref 0.45–4.5)
VIT B12 SERPL-MCNC: 189 PG/ML (ref 180–914)
WBC # BLD AUTO: 8.76 THOUSAND/UL (ref 4.31–10.16)

## 2023-06-14 PROCEDURE — 80061 LIPID PANEL: CPT

## 2023-06-14 PROCEDURE — 82533 TOTAL CORTISOL: CPT

## 2023-06-14 PROCEDURE — 83036 HEMOGLOBIN GLYCOSYLATED A1C: CPT

## 2023-06-14 PROCEDURE — 84466 ASSAY OF TRANSFERRIN: CPT

## 2023-06-14 PROCEDURE — 80053 COMPREHEN METABOLIC PANEL: CPT

## 2023-06-14 PROCEDURE — 36415 COLL VENOUS BLD VENIPUNCTURE: CPT

## 2023-06-14 PROCEDURE — 85025 COMPLETE CBC W/AUTO DIFF WBC: CPT

## 2023-06-14 PROCEDURE — 84443 ASSAY THYROID STIM HORMONE: CPT

## 2023-06-14 PROCEDURE — 82607 VITAMIN B-12: CPT

## 2023-06-14 PROCEDURE — 83540 ASSAY OF IRON: CPT

## 2023-06-14 PROCEDURE — 82746 ASSAY OF FOLIC ACID SERUM: CPT

## 2023-06-14 PROCEDURE — 99214 OFFICE O/P EST MOD 30 MIN: CPT | Performed by: INTERNAL MEDICINE

## 2023-06-14 RX ORDER — ROSUVASTATIN CALCIUM 40 MG/1
40 TABLET, COATED ORAL DAILY
Qty: 90 TABLET | Refills: 3 | Status: SHIPPED | OUTPATIENT
Start: 2023-06-14

## 2023-06-14 RX ORDER — VALACYCLOVIR HYDROCHLORIDE 500 MG/1
500 TABLET, FILM COATED ORAL 2 TIMES DAILY
Qty: 60 TABLET | Refills: 11 | Status: SHIPPED | OUTPATIENT
Start: 2023-06-14 | End: 2023-08-13

## 2023-06-14 RX ORDER — QUINIDINE GLUCONATE 324 MG
1 TABLET, EXTENDED RELEASE ORAL 2 TIMES DAILY
Qty: 180 TABLET | Refills: 3 | Status: SHIPPED | OUTPATIENT
Start: 2023-06-14

## 2023-06-14 NOTE — PROGRESS NOTES
Assessment/Plan:       Diagnoses and all orders for this visit:    Neuropathy  -     CBC and differential; Future  -     Comprehensive metabolic panel; Future  -     Hemoglobin A1C; Future  -     Lipid Panel with Direct LDL reflex; Future  -     TSH, 3rd generation with Free T4 reflex; Future  -     Folate; Future  -     Iron; Future  -     Transferrin; Future  -     Vitamin B12; Future  -     Cortisol Level,7-9 AM Specimen; Future    Iron deficiency anemia due to chronic blood loss  -     ferrous gluconate (Fergon) 240 (27 FE) MG tablet; Take 1 tablet (240 mg total) by mouth 2 (two) times a day    Mixed hyperlipidemia  -     rosuvastatin (CRESTOR) 40 MG tablet; Take 1 tablet (40 mg total) by mouth daily    Herpes simplex  -     valACYclovir (VALTREX) 500 mg tablet; Take 1 tablet (500 mg total) by mouth 2 (two) times a day    Colon cancer screening  -     Ambulatory Referral to Gastroenterology; Future                Subjective:      Patient ID: Marguerite Reardon is a 38 y o  female  A young woman with too many health issues although fortunately she seems to have nothing physically acute  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   Parathyroid adenoma about 5 years ago which presented is asymptomatic hypercalcemia   Given how young she was, 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  Pituitary MRI was normal but of course these  things do not have to be synchronous  Venous insufficiency secondary to varicose vein  Specifically, both saphenous veins  She has declined surgery because she is terrified of anesthesia    Fibroid uterus identified about 1 year ago  Complaint of heavy menses  Complaint of some neuropathy  She gets foot paresthesias after standing on her feet all day  The paresthesias involve the soles of the feet from heel-to-toe not just the toes  So this is more than metatarsalgia  Chronic anxious depression waxes and wanes    Her  is bipolar and their relationship has had its ups and downs  No cigarettes, rare alcohol, no illicit drugs  Not currently working outside the house     Frequent outbreaks of herpes labialis 1 which she treats with Valtrex  The following portions of the patient's history were reviewed and updated as appropriate:   She has a past medical history of Benign neoplasm of skin, Brain cancer ([facility] [address] ), Environmental and seasonal allergies, Hyperlipidemia, Hyperparathyroidism, primary ([facility] [address] ), Menorrhagia, Thyroid cyst, and Varicose vein of leg ,  does not have any pertinent problems on file  ,   has a past surgical history that includes  section; Parathyroidectomy; Brain surgery; and Foot surgery (Left)  ,  family history includes Alcohol abuse in her father; COPD in her mother; Cirrhosis in her father; Colon cancer in her maternal grandmother; Diabetes in her father and mother; Heart disease in her father and mother; Hyperlipidemia in her mother; Hypertension in her father and mother; Ovarian cancer in her maternal grandmother; Stroke in her mother; Thyroid disease in her sister  ,   reports that she has never smoked  She has never used smokeless tobacco  She reports current alcohol use  She reports that she does not use drugs  ,  is allergic to pollen extract     Current Outpatient Medications   Medication Sig Dispense Refill   • ferrous gluconate (Fergon) 240 (27 FE) MG tablet Take 1 tablet (240 mg total) by mouth 2 (two) times a day 180 tablet 3   • rosuvastatin (CRESTOR) 40 MG tablet Take 1 tablet (40 mg total) by mouth daily 90 tablet 3   • valACYclovir (VALTREX) 500 mg tablet Take 1 tablet (500 mg total) by mouth 2 (two) times a day 60 tablet 11   • norgestimate-ethinyl estradiol (ORTHO TRI-CYCLEN LO) 0 18/0 215/0 25 MG-25 MCG per tablet Take 1 tablet by mouth daily         No current facility-administered medications for this visit  Review of Systems   Constitutional: Positive for fatigue  Neurological: Positive for numbness  Objective:  Vitals:    06/14/23 0756   BP: 126/76   Pulse: 79   Temp: 97 6 °F (36 4 °C)   SpO2: 100%      Physical Exam  Constitutional:       Appearance: She is well-developed  Comments: Modestly overweight female patient who appears to be the stated age   HENT:      Head: Normocephalic and atraumatic  Eyes:      Pupils: Pupils are equal, round, and reactive to light  Neck:      Thyroid: No thyromegaly  Trachea: No tracheal deviation  Cardiovascular:      Rate and Rhythm: Normal rate and regular rhythm  Heart sounds: Normal heart sounds  No murmur heard  No gallop  Pulmonary:      Effort: No respiratory distress  Breath sounds: No wheezing or rales  Abdominal:      General: Bowel sounds are normal       Palpations: Abdomen is soft  Tenderness: There is no abdominal tenderness  Musculoskeletal:         General: No tenderness or deformity  Normal range of motion  Cervical back: Normal range of motion and neck supple  Skin:     General: Skin is warm  Neurological:      Mental Status: She is alert and oriented to person, place, and time  Coordination: Coordination normal    Psychiatric:         Judgment: Judgment normal            Patient Instructions   Laboratory testing to look for any metabolic causes of the neuropathy; alternative diagnosis is tarsal tunnel syndrome but we can do the metabolic work-up first    Gastro consult for discussion of ideal method of colon screen  Follow-up internal medicine yearly

## 2023-06-14 NOTE — PATIENT INSTRUCTIONS
Laboratory testing to look for any metabolic causes of the neuropathy; alternative diagnosis is tarsal tunnel syndrome but we can do the metabolic work-up first    Gastro consult for discussion of ideal method of colon screen  Follow-up internal medicine yearly

## 2023-06-15 LAB
EST. AVERAGE GLUCOSE BLD GHB EST-MCNC: 103 MG/DL
HBA1C MFR BLD: 5.2 %

## 2023-08-13 PROBLEM — Z00.00 HEALTHCARE MAINTENANCE: Status: RESOLVED | Noted: 2023-06-14 | Resolved: 2023-08-13

## 2023-12-20 ENCOUNTER — OFFICE VISIT (OUTPATIENT)
Age: 43
End: 2023-12-20
Payer: COMMERCIAL

## 2023-12-20 VITALS
HEIGHT: 62 IN | SYSTOLIC BLOOD PRESSURE: 131 MMHG | WEIGHT: 161.4 LBS | OXYGEN SATURATION: 99 % | DIASTOLIC BLOOD PRESSURE: 88 MMHG | BODY MASS INDEX: 29.7 KG/M2 | RESPIRATION RATE: 16 BRPM | TEMPERATURE: 97.6 F | HEART RATE: 87 BPM

## 2023-12-20 DIAGNOSIS — I83.813 VARICOSE VEINS OF BOTH LOWER EXTREMITIES WITH PAIN: ICD-10-CM

## 2023-12-20 DIAGNOSIS — E88.09 HYPOALBUMINEMIA: ICD-10-CM

## 2023-12-20 DIAGNOSIS — B00.9 HERPES SIMPLEX: ICD-10-CM

## 2023-12-20 DIAGNOSIS — E66.3 OVERWEIGHT: ICD-10-CM

## 2023-12-20 DIAGNOSIS — E55.9 VITAMIN D DEFICIENCY: ICD-10-CM

## 2023-12-20 DIAGNOSIS — E78.2 MIXED HYPERLIPIDEMIA: Primary | ICD-10-CM

## 2023-12-20 PROBLEM — F41.8 ANXIOUS DEPRESSION: Status: RESOLVED | Noted: 2019-02-28 | Resolved: 2023-12-20

## 2023-12-20 PROBLEM — R01.1 HEART MURMUR: Status: RESOLVED | Noted: 2020-10-01 | Resolved: 2023-12-20

## 2023-12-20 PROBLEM — L60.3 NAIL DYSTROPHY: Status: RESOLVED | Noted: 2020-02-05 | Resolved: 2023-12-20

## 2023-12-20 PROBLEM — D69.2 PURPURA (HCC): Status: RESOLVED | Noted: 2020-09-04 | Resolved: 2023-12-20

## 2023-12-20 PROBLEM — Z23 NEED FOR VACCINATION: Status: RESOLVED | Noted: 2020-10-28 | Resolved: 2023-12-20

## 2023-12-20 PROBLEM — L98.491 SKIN ULCER, LIMITED TO BREAKDOWN OF SKIN (HCC): Status: RESOLVED | Noted: 2020-10-28 | Resolved: 2023-12-20

## 2023-12-20 PROBLEM — D25.1 INTRAMURAL LEIOMYOMA OF UTERUS: Status: ACTIVE | Noted: 2020-09-28

## 2023-12-20 PROBLEM — H66.91 OTITIS OF RIGHT EAR: Status: RESOLVED | Noted: 2021-08-18 | Resolved: 2023-12-20

## 2023-12-20 PROBLEM — R53.82 CHRONIC FATIGUE: Status: RESOLVED | Noted: 2020-02-05 | Resolved: 2023-12-20

## 2023-12-20 PROCEDURE — 99214 OFFICE O/P EST MOD 30 MIN: CPT | Performed by: INTERNAL MEDICINE

## 2023-12-20 RX ORDER — VALACYCLOVIR HYDROCHLORIDE 500 MG/1
500 TABLET, FILM COATED ORAL 2 TIMES DAILY
Qty: 60 TABLET | Refills: 11 | Status: SHIPPED | OUTPATIENT
Start: 2023-12-20 | End: 2024-12-14

## 2023-12-20 NOTE — PROGRESS NOTES
INTERNAL MEDICINE OFFICE VISIT  [facility] [place] [address]  Tel: [phone]      NAME: Kimberley Frey  AGE: 43 y.o.  SEX: female  : 1980   MRN: [id number]    DATE: 2023  TIME: 9:45 AM       Assessment and Plan:  1. Mixed hyperlipidemia  Continue Crestor, lipid profile is stable  - CBC and differential; Future  - Comprehensive metabolic panel; Future  - Lipid panel; Future  - TSH, 3rd generation; Future    2. Hypoalbuminemia  Was advised to increase the protein intake in the diet    3. Varicose veins of both lower extremities with pain  Was told to wear compression stockings    4. Vitamin D deficiency  Continue vitamin D  - Vitamin D 25 hydroxy; Future    5. Overweight  BMI Counseling: Body mass index is 29.28 kg/m². The BMI is above normal. Nutrition recommendations include decreasing portion sizes, encouraging healthy choices of fruits and vegetables and moderation in carbohydrate intake. Exercise recommendations include moderate physical activity 150 minutes/week. Rationale for BMI follow-up plan is due to patient being overweight or obese.           - Counseling Documentation: patient was counseled regarding: diagnostic results, instructions for management, risk factor reductions, prognosis, patient and family education, risks and benefits of treatment options, and importance of compliance with treatment  - Medication Side Effects: Adverse side effects of medications were reviewed with the patient/guardian today.      Return for follow up visit in 6 months or earlier, if needed.      Chief Complaint:  Chief Complaint   Patient presents with    Establish Care         History of Present Illness:   Patient has been taking the Crestor but still the triglycerides are high.  She has familial hypercholesterolemia.  Has been trying to stay off nonvegetarian food and as a result her albumin is going down.  She was advised to increase the  protein intake in her diet.  Has been feeling better since she is wearing the compression stockings  Needs to lose weight      Active Problem List:  Patient Active Problem List   Diagnosis    Hypoalbuminemia    Venous insufficiency    Varicose veins of both lower extremities with pain    Goiter    Vitamin D deficiency    Intramural leiomyoma of uterus    Herpes simplex labialis    Mixed hyperlipidemia    Overweight         Past Medical History:  Past Medical History:   Diagnosis Date    Benign neoplasm of skin     Brain cancer (HCC)     pt states benign tumor     Environmental and seasonal allergies     Hyperlipidemia     Hyperparathyroidism, primary (HCC)     last assessed 2015    Menorrhagia     Thyroid cyst     Varicose vein of leg          Past Surgical History:  Past Surgical History:   Procedure Laterality Date    BRAIN SURGERY      Neurosurgery for brain benign tumor per pt report;  last assessed 2014     SECTION      FOOT SURGERY Left     PARATHYROIDECTOMY           Family History:  Family History   Problem Relation Age of Onset    Diabetes Mother     Heart disease Mother     COPD Mother     Hypertension Mother     Hyperlipidemia Mother     Stroke Mother     Diabetes Father     Heart disease Father     Alcohol abuse Father     Cirrhosis Father     Hypertension Father     Colon cancer Maternal Grandmother     Ovarian cancer Maternal Grandmother     Thyroid disease Sister          Social History:  Social History     Socioeconomic History    Marital status: /Civil Union     Spouse name: None    Number of children: None    Years of education: None    Highest education level: None   Occupational History    None   Tobacco Use    Smoking status: Never    Smokeless tobacco: Never   Vaping Use    Vaping status: Never Used   Substance and Sexual Activity    Alcohol use: Yes     Comment:  one drink every 2 months    Drug use: No    Sexual activity: Yes     Partners: Male   Other Topics Concern     None   Social History Narrative    None     Social Determinants of Health     Financial Resource Strain: Not on file   Food Insecurity: Not on file   Transportation Needs: Not on file   Physical Activity: Sufficiently Active (12/12/2022)    Exercise Vital Sign     Days of Exercise per Week: 7 days     Minutes of Exercise per Session: 30 min   Stress: Stress Concern Present (10/28/2020)    [employer] - Occupational Stress Questionnaire     Feeling of Stress : Very much   Social Connections: Not on file   Intimate Partner Violence: Not on file   Housing Stability: Not on file         Allergies:  Allergies   Allergen Reactions    Pollen Extract Sneezing and Nasal Congestion     Depending on the season         Medications:    Current Outpatient Medications:     ferrous gluconate (Fergon) 240 (27 FE) MG tablet, Take 1 tablet (240 mg total) by mouth 2 (two) times a day, Disp: 180 tablet, Rfl: 3    rosuvastatin (CRESTOR) 40 MG tablet, Take 1 tablet (40 mg total) by mouth daily, Disp: 90 tablet, Rfl: 3    norgestimate-ethinyl estradiol (ORTHO TRI-CYCLEN LO) 0.18/0.215/0.25 MG-25 MCG per tablet, Take 1 tablet by mouth daily  , Disp: , Rfl:     valACYclovir (VALTREX) 500 mg tablet, Take 1 tablet (500 mg total) by mouth 2 (two) times a day, Disp: 60 tablet, Rfl: 11      The following portions of the patient's history were reviewed and updated as appropriate: past medical history, past surgical history, family history, social history, allergies, current medications and active problem list.      Review of Systems:  Constitutional: Denies fever, chills, weight gain, weight loss, fatigue  Eyes: Denies eye redness, eye discharge, double vision, change in visual acuity  ENT: Denies hearing loss, tinnitus, sneezing, nasal congestion, nasal discharge, sore throat   Respiratory: Denies cough, expectoration, hemoptysis, shortness of breath, wheezing  Cardiovascular: Denies chest pain, palpitations, lower  extremity swelling, orthopnea, PND  Gastrointestinal: Denies abdominal pain, heartburn, nausea, vomiting, hematemesis, diarrhea, bloody stools  Genito-Urinary: Denies dysuria, frequency, difficulty in micturition, nocturia, incontinence  Musculoskeletal: Denies back pain, joint pain, muscle pain  Neurologic: Denies confusion, lightheadedness, syncope, headache, focal weakness, sensory changes, seizures  Endocrine: Denies polyuria, polydipsia, temperature intolerance  Allergy and Immunology: Denies hives, insect bite sensitivity  Hematological and Lymphatic: Denies bleeding problems, swollen glands   Psychological: Denies depression, suicidal ideation, anxiety, panic, mood swings  Dermatological: Denies pruritus, rash, skin lesion changes      Vitals:  Vitals:    12/20/23 0911   BP: 131/88   Pulse: 87   Resp: 16   Temp: 97.6 °F (36.4 °C)   SpO2: 99%       Body mass index is 29.28 kg/m².    Weight (last 2 days)       Date/Time Weight    12/20/23 0911 73.2 (161.4)              Physical Examination:  General: Patient is not in acute distress. Awake, alert, responding to commands. No weight gain or loss  Head: Normocephalic. Atraumatic  Eyes: Conjunctiva and lids with no swelling, erythema or discharge. Both pupils normal sized, round and reactive to light. Sclera nonicteric  ENT: External examination of nose and ear normal. Otoscopic examination shows translucent tympanic membranes with patent canals without erythema. Oropharynx moist with no erythema, edema, exudate or lesions  Neck: Supple. JVP not raised. Trachea midline. No masses. No thyromegaly  Lungs: No signs of increased work of breathing or respiratory distress. Bilateral bronchovascular breath sounds with no crackles or rhonchi  Chest wall: No tenderness  Cardiovascular: Normal PMI. No thrills. Regular rate and rhythm. S1 and S2 normal. No murmur, rub or gallop  Gastrointestinal: Abdomen soft, nontender. No guarding or rigidity. Liver and spleen not palpable.  "Bowel sounds present  Neurologic: Cranial nerves II-XII intact. Cortical functions normal. Motor system - Reflexes 2+ and symmetrical. Sensations normal  Musculoskeletal: Gait normal. No joint tenderness  Integumentary: Skin normal with no rash or lesions  Lymphatic: No palpable lymph nodes in neck, axilla or groin  Extremities: No clubbing, cyanosis, edema or varicosities  Psychological: Judgement and insight normal. Mood and affect normal      Laboratory Results:  CBC with diff:   Lab Results   Component Value Date    WBC 8.76 06/14/2023    WBC 6.6 06/05/2015    RBC 4.30 06/14/2023    RBC 4.52 06/05/2015    HGB 12.0 06/14/2023    HGB 13.1 06/05/2015    HCT 39.3 06/14/2023    HCT 39.7 06/05/2015    MCV 91 06/14/2023    MCV 88 06/05/2015    MCH 27.9 06/14/2023    MCH 29.0 06/05/2015    RDW 14.7 06/14/2023    RDW 12.3 06/05/2015     (H) 06/14/2023     06/05/2015       CMP:  Lab Results   Component Value Date    CREATININE 0.86 06/14/2023    CREATININE 0.6 06/05/2015    BUN 11 06/14/2023    BUN 14 06/05/2015     06/05/2015    K 4.0 06/14/2023    K 4.6 06/05/2015     (H) 06/14/2023     06/05/2015    CO2 24 06/14/2023    CO2 27.7 06/05/2015    GLUCOSE 81 06/05/2015    PROT 6.7 06/05/2015    ALKPHOS 54 06/14/2023    ALKPHOS 60 06/05/2015    ALT 25 06/14/2023    ALT 31 06/05/2015    AST 16 06/14/2023    AST 11 (L) 06/05/2015       Lab Results   Component Value Date    HGBA1C 5.2 06/14/2023       No results found for: \"TROPONINI\", \"CKMB\", \"CKTOTAL\"    Lipid Profile:   Lab Results   Component Value Date    CHOL 219 06/05/2015    CHOL 226 02/06/2014     Lab Results   Component Value Date    HDL 48 (L) 06/14/2023    HDL 44 (L) 12/12/2022     Lab Results   Component Value Date    LDLCALC 61 06/14/2023    LDLCALC 47 12/12/2022     Lab Results   Component Value Date    TRIG 277 (H) 06/14/2023    TRIG 266 (H) 12/12/2022       Imaging Results:  MRI brain pituitary wo contrast  Narrative: MRI BRAIN " AND SELLA  WITHOUT CONTRAST    INDICATION:  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    COMPARISON: MRI brain 2/17/2020    TECHNIQUE:  Brain: Axial diffusion-weighted imaging.  Axial FLAIR and axial T2.  Axial gradient.    Sella: Sagittal and coronal T1.  Coronal T2.      Targeted images of the sella were performed requiring additional time at acquisition and interpretation of approximately 25%    IV Contrast:  Not administered     IMAGE QUALITY:  Diagnostic.    FINDINGS:    BRAIN PARENCHYMA:    Similar focal area of right frontal lobe encephalomalacia with surrounding gliosis.    There is no discrete mass, mass effect or midline shift.  Brainstem and cerebellum demonstrate normal signal. There is no intracranial hemorrhage.  There is no evidence of acute infarction and diffusion imaging is unremarkable.  There are no white matter   changes in the cerebral hemispheres.    VENTRICLES:  Normal.    SELLA AND PITUITARY GLAND:  The gland is homogeneous and normal in size.  The pituitary stalk is midline.  Normal parasellar and suprasellar structures.    ORBITS:  Normal.    PARANASAL SINUSES:  Normal.    VASCULATURE:  Evaluation of the major intracranial vasculature demonstrates appropriate flow voids.    CALVARIUM AND SKULL BASE:  Normal.    EXTRACRANIAL SOFT TISSUES:  Normal.  Impression: 1. Stable focal area of right frontal lobe encephalomalacia and surrounding gliosis.  2. No acute infarction, edema, or mass effect.    Workstation performed: [id number]       Health Maintenance:  Health Maintenance   Topic Date Due    Annual Physical  Never done    DTaP,Tdap,and Td Vaccines (1 - Tdap) 06/18/1999    BMI: Followup Plan  02/05/2021    Colorectal Cancer Screening  Never done    Influenza Vaccine (1) 09/01/2023    COVID-19 Vaccine (3 - 2023-24 season) 09/01/2023    BMI: Adult  06/14/2024    Breast Cancer Screening: Mammogram  11/02/2024    Cervical Cancer Screening   09/28/2025    HIV Screening  Completed    Hepatitis C Screening  Completed    Pneumococcal Vaccine: Pediatrics (0 to 5 Years) and At-Risk Patients (6 to 64 Years)  Aged Out    HIB Vaccine  Aged Out    IPV Vaccine  Aged Out    Hepatitis A Vaccine  Aged Out    Meningococcal ACWY Vaccine  Aged Out    HPV Vaccine  Aged Out     Immunization History   Administered Date(s) Administered    COVID-19 PFIZER VACCINE 0.3 ML IM 05/01/2021, 05/25/2021    Influenza, injectable, quadrivalent, preservative free 0.5 mL 10/28/2020    Influenza, seasonal, injectable 1980    Tdap 1980         Yulia Ugarte MD  12/20/2023,9:45 AM

## 2024-01-08 ENCOUNTER — OFFICE VISIT (OUTPATIENT)
Dept: GASTROENTEROLOGY | Facility: CLINIC | Age: 44
End: 2024-01-08
Payer: COMMERCIAL

## 2024-01-08 VITALS
WEIGHT: 180.8 LBS | HEIGHT: 62 IN | SYSTOLIC BLOOD PRESSURE: 126 MMHG | BODY MASS INDEX: 33.27 KG/M2 | DIASTOLIC BLOOD PRESSURE: 84 MMHG | HEART RATE: 84 BPM

## 2024-01-08 DIAGNOSIS — Z11.59 ENCOUNTER FOR HEPATITIS C SCREENING TEST FOR LOW RISK PATIENT: ICD-10-CM

## 2024-01-08 DIAGNOSIS — Z12.11 COLON CANCER SCREENING: Primary | ICD-10-CM

## 2024-01-08 PROCEDURE — 99243 OFF/OP CNSLTJ NEW/EST LOW 30: CPT | Performed by: INTERNAL MEDICINE

## 2024-01-08 NOTE — H&P (VIEW-ONLY)
[facility] - Outpatient Note  Kimberley Frey 43 y.o. female MRN: [id number]  Encounter: [id number]      ASSESSMENT AND PLAN:    Kimberley Frey is a 43 y.o. old pleasant female with PMH of obesity's, goiter, iron deficiency anemia, uterine fibroids, anxiety who presents for consultation for colon cancer screening, obesity and hepatitis C screening    Colon cancer screening-no previous colonoscopy.  Grandmother had colon cancer.  Essentially asymptomatic from a GI standpoint.  Plan for colonoscopy    Obesity-BMI 33 today.  Counseled on diet and exercise    Hepatitis C screening-previous testing 2019 negative  No further testing needed          1. Colon cancer screening    - Colonoscopy; Future    2. Encounter for hepatitis C screening test for low risk patient    - Hepatitis C antibody; Future    ______________________________________________________________________    SUBJECTIVE: Patient denies abdominal pain nausea vomit constipation diarrhea.  She denies weight loss dysphagia odynophagia GERD.  She has not had previous EGD or colonoscopy.  Her father had cirrhosis.  Grandmother had colon cancer.  Previously she had iron deficiency anemia and is currently on iron tablets however she states her recent fracture this is her fibroids which have shrunk in size.  No significant alcohol or smoking history.      I reviewed prior external notes    I reviewed previous lab results and images      REVIEW OF SYSTEMS:     REVIEW OF ALL OTHER SYSTEMS IS OTHERWISE NEGATIVE.      Historical Information   Past Medical History:   Diagnosis Date    Benign neoplasm of skin     Brain cancer (HCC)     pt states benign tumor     Environmental and seasonal allergies     Hyperlipidemia     Hyperparathyroidism, primary (HCC)     last assessed 10Jun2015    Menorrhagia     Thyroid cyst     Varicose vein of leg      Past Surgical History:   Procedure Laterality Date    BRAIN SURGERY      Neurosurgery for brain benign  "tumor per pt report;  last assessed [id number]     SECTION      FOOT SURGERY Left     PARATHYROIDECTOMY       Social History   Social History     Substance and Sexual Activity   Alcohol Use Not Currently    Comment:  one drink every 2 months     Social History     Substance and Sexual Activity   Drug Use No     Social History     Tobacco Use   Smoking Status Never   Smokeless Tobacco Never     Family History   Problem Relation Age of Onset    Diabetes Mother     Heart disease Mother     COPD Mother     Hypertension Mother     Hyperlipidemia Mother     Stroke Mother     Diabetes Father     Heart disease Father     Alcohol abuse Father     Cirrhosis Father     Hypertension Father     Colon cancer Maternal Grandmother     Ovarian cancer Maternal Grandmother     Thyroid disease Sister        Meds/Allergies       Current Outpatient Medications:     ferrous gluconate (Fergon) 240 (27 FE) MG tablet    rosuvastatin (CRESTOR) 40 MG tablet    valACYclovir (VALTREX) 500 mg tablet    norgestimate-ethinyl estradiol (ORTHO TRI-CYCLEN LO) 0.18/0.215/0.25 MG-25 MCG per tablet    Allergies   Allergen Reactions    Pollen Extract Sneezing and Nasal Congestion     Depending on the season           Objective     Blood pressure 126/84, pulse 84, height 5' 2\" (1.575 m), weight 82 kg (180 lb 12.8 oz). Body mass index is 33.07 kg/m².      PHYSICAL EXAM:      General Appearance:   Alert, cooperative, no distress   HEENT:   Normocephalic, atraumatic, anicteric.     Neck:  Supple, symmetrical, trachea midline   Lungs:   Clear to auscultation bilaterally; no rales, rhonchi or wheezing; respirations unlabored    Heart::   Regular rate and rhythm; no murmur, rub, or gallop.   Abdomen:   Soft, non-tender, non-distended; normal bowel sounds; no masses, no organomegaly    Genitalia:   Deferred    Rectal:   Deferred    Extremities:  No cyanosis, clubbing or edema    Pulses:  2+ and symmetric    Skin:  No jaundice, rashes, or lesions    Lymph " nodes:  No palpable cervical lymphadenopathy        Lab Results:   No visits with results within 1 Day(s) from this visit.   Latest known visit with results is:   Appointment on 06/14/2023   Component Date Value    WBC 06/14/2023 8.76     RBC 06/14/2023 4.30     Hemoglobin 06/14/2023 12.0     Hematocrit 06/14/2023 39.3     MCV 06/14/2023 91     MCH 06/14/2023 27.9     MCHC 06/14/2023 30.5 (L)     RDW 06/14/2023 14.7     MPV 06/14/2023 11.1     Platelets 06/14/2023 404 (H)     nRBC 06/14/2023 0     Neutrophils Relative 06/14/2023 66     Immat GRANS % 06/14/2023 0     Lymphocytes Relative 06/14/2023 23     Monocytes Relative 06/14/2023 7     Eosinophils Relative 06/14/2023 3     Basophils Relative 06/14/2023 1     Neutrophils Absolute 06/14/2023 5.81     Immature Grans Absolute 06/14/2023 0.03     Lymphocytes Absolute 06/14/2023 2.00     Monocytes Absolute 06/14/2023 0.62     Eosinophils Absolute 06/14/2023 0.22     Basophils Absolute 06/14/2023 0.08     Sodium 06/14/2023 139     Potassium 06/14/2023 4.0     Chloride 06/14/2023 109 (H)     CO2 06/14/2023 24     ANION GAP 06/14/2023 6     BUN 06/14/2023 11     Creatinine 06/14/2023 0.86     Glucose, Fasting 06/14/2023 81     Calcium 06/14/2023 9.5     Corrected Calcium 06/14/2023 10.0     AST 06/14/2023 16     ALT 06/14/2023 25     Alkaline Phosphatase 06/14/2023 54     Total Protein 06/14/2023 7.0     Albumin 06/14/2023 3.4 (L)     Total Bilirubin 06/14/2023 0.55     eGFR 06/14/2023 82     Hemoglobin A1C 06/14/2023 5.2     EAG 06/14/2023 103     Cholesterol 06/14/2023 164     Triglycerides 06/14/2023 277 (H)     HDL, Direct 06/14/2023 48 (L)     LDL Calculated 06/14/2023 61     TSH 3RD GENERATON 06/14/2023 2.008     Folate 06/14/2023 19.2     Iron 06/14/2023 45 (L)     Transferrin 06/14/2023 417 (H)     Vitamin B-12 06/14/2023 189     Cortisol - AM 06/14/2023 15.1          Radiology Results:   No results found.

## 2024-01-08 NOTE — PROGRESS NOTES
[facility] - Outpatient Note  Kimberley Frey 43 y.o. female MRN: [id number]  Encounter: [id number]      ASSESSMENT AND PLAN:    Kimberley Frey is a 43 y.o. old pleasant female with PMH of obesity's, goiter, iron deficiency anemia, uterine fibroids, anxiety who presents for consultation for colon cancer screening, obesity and hepatitis C screening    Colon cancer screening-no previous colonoscopy.  Grandmother had colon cancer.  Essentially asymptomatic from a GI standpoint.  Plan for colonoscopy    Obesity-BMI 33 today.  Counseled on diet and exercise    Hepatitis C screening-previous testing 2019 negative  No further testing needed          1. Colon cancer screening    - Colonoscopy; Future    2. Encounter for hepatitis C screening test for low risk patient    - Hepatitis C antibody; Future    ______________________________________________________________________    SUBJECTIVE: Patient denies abdominal pain nausea vomit constipation diarrhea.  She denies weight loss dysphagia odynophagia GERD.  She has not had previous EGD or colonoscopy.  Her father had cirrhosis.  Grandmother had colon cancer.  Previously she had iron deficiency anemia and is currently on iron tablets however she states her recent fracture this is her fibroids which have shrunk in size.  No significant alcohol or smoking history.      I reviewed prior external notes    I reviewed previous lab results and images      REVIEW OF SYSTEMS:     REVIEW OF ALL OTHER SYSTEMS IS OTHERWISE NEGATIVE.      Historical Information   Past Medical History:   Diagnosis Date    Benign neoplasm of skin     Brain cancer (HCC)     pt states benign tumor     Environmental and seasonal allergies     Hyperlipidemia     Hyperparathyroidism, primary (HCC)     last assessed 10Jun2015    Menorrhagia     Thyroid cyst     Varicose vein of leg      Past Surgical History:   Procedure Laterality Date    BRAIN SURGERY      Neurosurgery for brain benign  "tumor per pt report;  last assessed [id number]     SECTION      FOOT SURGERY Left     PARATHYROIDECTOMY       Social History   Social History     Substance and Sexual Activity   Alcohol Use Not Currently    Comment:  one drink every 2 months     Social History     Substance and Sexual Activity   Drug Use No     Social History     Tobacco Use   Smoking Status Never   Smokeless Tobacco Never     Family History   Problem Relation Age of Onset    Diabetes Mother     Heart disease Mother     COPD Mother     Hypertension Mother     Hyperlipidemia Mother     Stroke Mother     Diabetes Father     Heart disease Father     Alcohol abuse Father     Cirrhosis Father     Hypertension Father     Colon cancer Maternal Grandmother     Ovarian cancer Maternal Grandmother     Thyroid disease Sister        Meds/Allergies       Current Outpatient Medications:     ferrous gluconate (Fergon) 240 (27 FE) MG tablet    rosuvastatin (CRESTOR) 40 MG tablet    valACYclovir (VALTREX) 500 mg tablet    norgestimate-ethinyl estradiol (ORTHO TRI-CYCLEN LO) 0.18/0.215/0.25 MG-25 MCG per tablet    Allergies   Allergen Reactions    Pollen Extract Sneezing and Nasal Congestion     Depending on the season           Objective     Blood pressure 126/84, pulse 84, height 5' 2\" (1.575 m), weight 82 kg (180 lb 12.8 oz). Body mass index is 33.07 kg/m².      PHYSICAL EXAM:      General Appearance:   Alert, cooperative, no distress   HEENT:   Normocephalic, atraumatic, anicteric.     Neck:  Supple, symmetrical, trachea midline   Lungs:   Clear to auscultation bilaterally; no rales, rhonchi or wheezing; respirations unlabored    Heart::   Regular rate and rhythm; no murmur, rub, or gallop.   Abdomen:   Soft, non-tender, non-distended; normal bowel sounds; no masses, no organomegaly    Genitalia:   Deferred    Rectal:   Deferred    Extremities:  No cyanosis, clubbing or edema    Pulses:  2+ and symmetric    Skin:  No jaundice, rashes, or lesions    Lymph " nodes:  No palpable cervical lymphadenopathy        Lab Results:   No visits with results within 1 Day(s) from this visit.   Latest known visit with results is:   Appointment on 06/14/2023   Component Date Value    WBC 06/14/2023 8.76     RBC 06/14/2023 4.30     Hemoglobin 06/14/2023 12.0     Hematocrit 06/14/2023 39.3     MCV 06/14/2023 91     MCH 06/14/2023 27.9     MCHC 06/14/2023 30.5 (L)     RDW 06/14/2023 14.7     MPV 06/14/2023 11.1     Platelets 06/14/2023 404 (H)     nRBC 06/14/2023 0     Neutrophils Relative 06/14/2023 66     Immat GRANS % 06/14/2023 0     Lymphocytes Relative 06/14/2023 23     Monocytes Relative 06/14/2023 7     Eosinophils Relative 06/14/2023 3     Basophils Relative 06/14/2023 1     Neutrophils Absolute 06/14/2023 5.81     Immature Grans Absolute 06/14/2023 0.03     Lymphocytes Absolute 06/14/2023 2.00     Monocytes Absolute 06/14/2023 0.62     Eosinophils Absolute 06/14/2023 0.22     Basophils Absolute 06/14/2023 0.08     Sodium 06/14/2023 139     Potassium 06/14/2023 4.0     Chloride 06/14/2023 109 (H)     CO2 06/14/2023 24     ANION GAP 06/14/2023 6     BUN 06/14/2023 11     Creatinine 06/14/2023 0.86     Glucose, Fasting 06/14/2023 81     Calcium 06/14/2023 9.5     Corrected Calcium 06/14/2023 10.0     AST 06/14/2023 16     ALT 06/14/2023 25     Alkaline Phosphatase 06/14/2023 54     Total Protein 06/14/2023 7.0     Albumin 06/14/2023 3.4 (L)     Total Bilirubin 06/14/2023 0.55     eGFR 06/14/2023 82     Hemoglobin A1C 06/14/2023 5.2     EAG 06/14/2023 103     Cholesterol 06/14/2023 164     Triglycerides 06/14/2023 277 (H)     HDL, Direct 06/14/2023 48 (L)     LDL Calculated 06/14/2023 61     TSH 3RD GENERATON 06/14/2023 2.008     Folate 06/14/2023 19.2     Iron 06/14/2023 45 (L)     Transferrin 06/14/2023 417 (H)     Vitamin B-12 06/14/2023 189     Cortisol - AM 06/14/2023 15.1          Radiology Results:   No results found.

## 2024-01-08 NOTE — PATIENT INSTRUCTIONS
Scheduled date of colonoscopy (as of today):1/23/24  Physician performing colonoscopy:Imer  Location of colonoscopy:[place]  Bowel prep reviewed with patient:miralax/dulcolax  Instructions reviewed with patient by:Brandy TABARES  Clearances:  none

## 2024-01-23 ENCOUNTER — ANESTHESIA EVENT (OUTPATIENT)
Dept: GASTROENTEROLOGY | Facility: HOSPITAL | Age: 44
End: 2024-01-23

## 2024-01-23 ENCOUNTER — HOSPITAL ENCOUNTER (OUTPATIENT)
Dept: GASTROENTEROLOGY | Facility: HOSPITAL | Age: 44
Setting detail: OUTPATIENT SURGERY
Discharge: HOME | End: 2024-01-23
Attending: INTERNAL MEDICINE
Payer: COMMERCIAL

## 2024-01-23 ENCOUNTER — ANESTHESIA (OUTPATIENT)
Dept: GASTROENTEROLOGY | Facility: HOSPITAL | Age: 44
End: 2024-01-23

## 2024-01-23 VITALS
WEIGHT: 176.37 LBS | HEIGHT: 62 IN | SYSTOLIC BLOOD PRESSURE: 127 MMHG | RESPIRATION RATE: 16 BRPM | OXYGEN SATURATION: 100 % | HEART RATE: 78 BPM | BODY MASS INDEX: 32.46 KG/M2 | DIASTOLIC BLOOD PRESSURE: 74 MMHG | TEMPERATURE: 98.7 F

## 2024-01-23 DIAGNOSIS — Z12.11 COLON CANCER SCREENING: ICD-10-CM

## 2024-01-23 LAB
EXT PREGNANCY TEST URINE: NEGATIVE
EXT. CONTROL: NORMAL

## 2024-01-23 PROCEDURE — 81025 URINE PREGNANCY TEST: CPT | Performed by: INTERNAL MEDICINE

## 2024-01-23 PROCEDURE — 88305 TISSUE EXAM BY PATHOLOGIST: CPT | Performed by: STUDENT IN AN ORGANIZED HEALTH CARE EDUCATION/TRAINING PROGRAM

## 2024-01-23 RX ORDER — SODIUM CHLORIDE, SODIUM LACTATE, POTASSIUM CHLORIDE, CALCIUM CHLORIDE 600; 310; 30; 20 MG/100ML; MG/100ML; MG/100ML; MG/100ML
INJECTION, SOLUTION INTRAVENOUS CONTINUOUS PRN
Status: DISCONTINUED | OUTPATIENT
Start: 2024-01-23 | End: 2024-01-23

## 2024-01-23 RX ORDER — PROPOFOL 10 MG/ML
INJECTION, EMULSION INTRAVENOUS AS NEEDED
Status: DISCONTINUED | OUTPATIENT
Start: 2024-01-23 | End: 2024-01-23

## 2024-01-23 RX ORDER — LIDOCAINE HYDROCHLORIDE 20 MG/ML
INJECTION, SOLUTION EPIDURAL; INFILTRATION; INTRACAUDAL; PERINEURAL AS NEEDED
Status: DISCONTINUED | OUTPATIENT
Start: 2024-01-23 | End: 2024-01-23

## 2024-01-23 RX ADMIN — PROPOFOL 50 MG: 10 INJECTION, EMULSION INTRAVENOUS at 10:47

## 2024-01-23 RX ADMIN — PROPOFOL 150 MG: 10 INJECTION, EMULSION INTRAVENOUS at 10:39

## 2024-01-23 RX ADMIN — PROPOFOL 50 MG: 10 INJECTION, EMULSION INTRAVENOUS at 10:50

## 2024-01-23 RX ADMIN — LIDOCAINE HYDROCHLORIDE 50 MG: 20 INJECTION, SOLUTION EPIDURAL; INFILTRATION; INTRACAUDAL; PERINEURAL at 10:39

## 2024-01-23 RX ADMIN — SODIUM CHLORIDE, SODIUM LACTATE, POTASSIUM CHLORIDE, AND CALCIUM CHLORIDE: .6; .31; .03; .02 INJECTION, SOLUTION INTRAVENOUS at 10:36

## 2024-01-23 RX ADMIN — PROPOFOL 50 MG: 10 INJECTION, EMULSION INTRAVENOUS at 10:42

## 2024-01-23 NOTE — ANESTHESIA POSTPROCEDURE EVALUATION
Post-Op Assessment Note    CV Status:  Stable  Pain Score: 0    Pain management: adequate       Mental Status:  Sleepy   Hydration Status:  Stable   PONV Controlled:  None   Airway Patency:  Patent     Post Op Vitals Reviewed: Yes    No anethesia notable event occurred.    Staff: CRNA               /68 (01/23/24 1057)    Temp 98.7 °F (37.1 °C) (01/23/24 1057)    Pulse 82 (01/23/24 1057)   Resp 20 (01/23/24 1057)    SpO2 100 % (01/23/24 1057)

## 2024-01-23 NOTE — ANESTHESIA PREPROCEDURE EVALUATION
Procedure:  COLONOSCOPY    Relevant Problems   CARDIO   (+) Mixed hyperlipidemia        Physical Exam    Airway    Mallampati score: II  TM Distance: >3 FB  Neck ROM: full     Dental   No notable dental hx     Cardiovascular  Cardiovascular exam normal    Pulmonary  Pulmonary exam normal     Other Findings  post-pubertal.      Anesthesia Plan  ASA Score- 2     Anesthesia Type- IV sedation with anesthesia with ASA Monitors.         Additional Monitors:     Airway Plan:            Plan Factors-Exercise tolerance (METS): >4 METS.    Chart reviewed.    Patient summary reviewed.    Patient is not a current smoker.              Induction- intravenous.    Postoperative Plan-     Informed Consent- Anesthetic plan and risks discussed with patient.

## 2024-01-23 NOTE — INTERVAL H&P NOTE
H&P reviewed. After examining the patient I find no changes in the patients condition since the H&P had been written.    Vitals:    01/23/24 1016   BP: 140/70   Pulse: 92   Resp: 18   Temp: 98.6 °F (37 °C)   SpO2: 98%

## 2024-01-25 PROCEDURE — 88305 TISSUE EXAM BY PATHOLOGIST: CPT | Performed by: STUDENT IN AN ORGANIZED HEALTH CARE EDUCATION/TRAINING PROGRAM

## 2024-01-26 ENCOUNTER — TELEPHONE (OUTPATIENT)
Age: 44
End: 2024-01-26

## 2024-01-26 NOTE — TELEPHONE ENCOUNTER
----- Message from Vitaly Willams MD sent at 1/26/2024  7:09 AM EST -----  Hi can call patient about 1 precancerous polyp removed.  Repeat colonoscopy in 5 years (NOT 7years which initially told patient).  Thank you.

## 2024-06-24 ENCOUNTER — OFFICE VISIT (OUTPATIENT)
Age: 44
End: 2024-06-24
Payer: COMMERCIAL

## 2024-06-24 VITALS — HEIGHT: 62 IN | WEIGHT: 175 LBS | BODY MASS INDEX: 32.2 KG/M2 | TEMPERATURE: 98.9 F

## 2024-06-24 DIAGNOSIS — D22.9 MULTIPLE MELANOCYTIC NEVI: ICD-10-CM

## 2024-06-24 DIAGNOSIS — L82.1 SEBORRHEIC KERATOSIS: Primary | ICD-10-CM

## 2024-06-24 DIAGNOSIS — D18.01 CHERRY ANGIOMA: ICD-10-CM

## 2024-06-24 DIAGNOSIS — L73.8 SEBACEOUS HYPERPLASIA OF FACE: ICD-10-CM

## 2024-06-24 PROCEDURE — 99213 OFFICE O/P EST LOW 20 MIN: CPT

## 2024-06-24 NOTE — PATIENT INSTRUCTIONS
VENOUS STASIS  Assessment and Plan:  Based on a thorough discussion of this condition and the management approach to it (including a comprehensive discussion of the known risks, side effects and potential benefits of treatment), the patient (family) agrees to implement the following specific plan:  Continue to wear compression stockings daily    What is venous eczema?  Venous eczema is a common form of eczema/dermatitis that affects one or both lower legs in association with venous insufficiency. It is also called gravitational dermatitis.    Who gets venous eczema?  Venous eczema is most often seen in middle-aged and elderly patients -- it is reported to affect 20% of those over 70 years. It is associated with:  History of deep venous thrombosis in an affected limb  History of cellulitis in an affected limb  Chronic swelling of the lower leg, aggravated by hot weather and prolonged standing  Varicose veins  Venous leg ulcers.    What causes venous eczema?  Venous eczema appears to be due to fluid collecting in the tissues and activation of the innate immune response.    Normally during walking the leg muscles pump blood upwards and valves in the veins prevent pooling. A clot in the deep leg veins (deep venous thrombosis or DVT) or varicose veins may damage the valves. As a result back pressure develops and fluid collects in the tissues. An inflammatory reaction occurs.    What are the clinical features of venous eczema?  Venous eczema can form discrete patches or become confluent and circumferential. Features include:  Itchy red, blistered and crusted plaques; or dry fissured and scaly plaques on one or both lower legs  Orange-brown macular pigmentation due to haemosiderin deposition  Atrophie shelton (white irregular scars surrounded by red spots)  'Champagne bottle' shape of the lower leg -- narrowing at the ankles and induration (lipodermatosclerosis)    What are the complications of venous  eczema?  Impetiginisation -- secondary infection with Staphylococcus aureus resulting in yellowish crusts  Cellulitis -- infection with Streptococcus pyogenes: there may be redness, swelling, pain, fever, a red streak up the leg and swollen nodes in the groin  Secondary eczema -- the eczema spreads to other areas on the body  Contact allergy to one or more components of the ointments or creams used    How is venous eczema diagnosed?  The diagnosis of venous eczema is clinical.  Patch tests may be undertaken if there is suspicion of contact allergy.    What is the treatment for venous eczema?  Reduce swelling in the leg  Don't stand for long periods.  Take regular walks.  Elevate your feet when sitting: if your legs are swollen they need to be above your hips to drain effectively.  Elevate the foot of your bed overnight.  During the acute phase of eczema, bandaging is important to reduce swelling.  When eczema has settled, wear graduated compression socks or stockings long term. Fitted moderate to high compression socks can be obtained from a surgical supplies company. Light compression using travel socks may be adequate, and these are easy to put on. They can be bought at pharmacies, travel and sports stores. More compression is obtained by wearing two pairs.  Horse chestnut extract appears to be of benefit for at least some patients with venous disease.    Treat the eczema  Dry up oozing patches with Condy's solution (potassium permanganate) or dilute vinegar on gauze as compresses.  Oral antibiotics such as flucloxacillin may be prescribed for a secondary infection.  Apply a prescribed topical steroid: start with a potent steroid cream applied accurately daily to the patches until they have flattened out. After a few days, change to a milder steroid cream (eg. hydrocortisone) until the itchy patches have resolved (maintenance treatment). Check with your doctor if you are using steroid creams for more than a few  weeks. Overuse can thin the skin, but short courses of stronger preparations can be used from time to time if necessary to control dermatitis. Coal tar ointment may also help.  Use a moisturizing cream frequently to keep the skin on the legs smooth and soft. If the skin is very scaly, urea cream may be especially effective.  Protect your skin from injury: this can result in infection or ulceration that may be difficult to heal.    Treatment for varicose veins  Seek the opinion of a vascular surgeon regarding varicose veins  These can be treated surgically or sclerotherapy.  Varicose veins may develop again after an apparently successful operation because venous disease is progressive.    How can venous eczema be prevented?  Venous eczema cannot be completely prevented but the number and severity of flare-ups can be reduced by the following measures.  Avoid prolonged standing or sitting with legs down  Wear compression socks or stockings  Avoid and treat leg swelling  Apply emollients frequently and regularly to dry skin  Avoid soap; use water alone or non-soap cleansers when bathing    What is the outlook for venous eczema?  Venous eczema tends to be a recurring or chronic disorder lifelong. Treat recurrence promptly with topical steroids.    SEBACEOUS HYPERPLASIA    Assessment and Plan:  Based on a thorough discussion of this condition and the management approach to it (including a comprehensive discussion of the known risks, side effects and potential benefits of treatment), the patient (family) agrees to implement the following specific plan:  Reassured benign    Sebaceous Hyperplasia  Sebaceous hyperplasia is a common, benign condition of enlarged oil secreting (sebaceous) glands commonly found on the forehead and cheeks of middle-aged and elderly patients. They normally appear as small yellow bumps up to 3mm in diameter that can be single or multiple. The bumps on the face often display a centrall dell.  Occasionally, these bumps can occur on the chest, areola, mouth, and genitals. Rarely, they can grow to take a giant form, or be arranged linearly.     Causes of sebaceous hyperplasia  Sebaceous hyperplasic is a form of benign hair follicle tumor and can often be confused with basal cell carcinoma. It can be more prevalent in immunosuppressed patients such as those undergoing organ transplantation. In the rare Giacomo-Arturo syndrome, sebaceous hyperplasia occurs in association with internal cancers.  Lesions of sebaceous hyperplasia are benign, with no known potential for malignant transformation, but they may be associated with nonmelanoma skin cancer in transplantation patients.     How we do diagnose sebaceous hyperplasia?  Your dermatologist may take a closer look at the bumps with a device called a dermatoscope. Common features include a central hair follicle surrounded by yellowish lobules with prominent blood vessels.     What is the treatment of sebaceous hyperplasia?   Since sebaceous hyperplasia is benign with no known potential for transformation into cancer, treatment is mostly for cosmetic reasons or if the lesions become irritated. Options include   Light electrocautery or laser vaporization  Oral isotrentinoin is effective for extensive or disfiguring spots, but do not prevent recurrence   Antiandrogens may be used in females to decrease the size and improve overall appearance of bumps

## 2024-06-24 NOTE — PROGRESS NOTES
"[facility] Note     Patient Name: Kimberley Frey  Encounter Date: 6/24/24     Have you been cared for by a [facility] Dermatologist in the last 3 years and, if so, which description applies to you?    Yes.  I have been here within the last 3 years, and my medical history has NOT changed since that time.  I am FEMALE/of child-bearing potential.    REVIEW OF SYSTEMS:  Have you recently had or currently have any of the following? No changes in my recent health.   PAST MEDICAL HISTORY:  Have you personally ever had or currently have any of the following?  If \"YES,\" then please provide more detail. No changes in my medical history.   HISTORY OF IMMUNOSUPPRESSION: Do you have a history of any of the following:  Systemic Immunosuppression such as Diabetes, Biologic or Immunotherapy, Chemotherapy, Organ Transplantation, Bone Marrow Transplantation?  No     Answering \"YES\" requires the addition of the dotphrase \"IMMUNOSUPPRESSED\" as the first diagnosis of the patient's visit.   FAMILY HISTORY:  Any \"first degree relatives\" (parent, brother, sister, or child) with the following?    No changes in my family's known health.   PATIENT EXPERIENCE:    Do you want the Dermatologist to perform a COMPLETE skin exam today including a clinical examination under the \"bra and underwear\" areas?  Yes  If necessary, do we have your permission to call and leave a detailed message on your Preferred Phone number that includes your specific medical information?  Yes      Allergies   Allergen Reactions    Pollen Extract Sneezing and Nasal Congestion     Depending on the season      Current Outpatient Medications:     ferrous gluconate (Fergon) 240 (27 FE) MG tablet, Take 1 tablet (240 mg total) by mouth 2 (two) times a day, Disp: 180 tablet, Rfl: 3    norgestimate-ethinyl estradiol (ORTHO TRI-CYCLEN LO) 0.18/0.215/0.25 MG-25 MCG per tablet, Take 1 tablet by mouth daily  , Disp: , Rfl:     rosuvastatin (CRESTOR) 40 MG tablet, Take " "1 tablet (40 mg total) by mouth daily, Disp: 90 tablet, Rfl: 3    valACYclovir (VALTREX) 500 mg tablet, Take 1 tablet (500 mg total) by mouth 2 (two) times a day (Patient taking differently: Take 500 mg by mouth 2 (two) times a day As needed), Disp: 60 tablet, Rfl: 11          Whom besides the patient is providing clinical information about today's encounter?   NO ADDITIONAL HISTORIAN (patient alone provided history)    Physical Exam and Assessment/Plan by Diagnosis:      MELANOCYTIC NEVI (\"Moles\")    Physical Exam:  Anatomic Location Affected: Mostly on sun-exposed areas of the trunk and extremities  Morphological Description:  Scattered, 1-4mm round to ovoid, symmetrical-appearing, even bordered, skin colored to dark brown macules/papules, mostly in sun-exposed areas  Pertinent Positives:  Pertinent Negatives:    Additional History of Present Condition: present on exam    Assessment and Plan:  Based on a thorough discussion of this condition and the management approach to it (including a comprehensive discussion of the known risks, side effects and potential benefits of treatment), the patient (family) agrees to implement the following specific plan:  Provided handout with information regarding the ABCDE's of moles   Recommend routine skin exams every two years  Sun avoidance, protective clothing (known as UPF clothing), and the use of at least SPF 30 sunscreens is advised. Sunscreen should be reapplied every two hours when outside.     SEBORRHEIC KERATOSIS; NON-INFLAMED    Physical Exam:  Anatomic Location Affected:  scattered across trunk, extremities,  scalp  Morphological Description:  Flat and raised, waxy, smooth to warty textured, yellow to brownish-grey to dark brown to blackish, discrete, \"stuck-on\" appearing papules.  Pertinent Positives:  Pertinent Negatives:    Additional History of Present Condition:  Patient reports new bumps on the skin.  Denies itch, burn, pain, bleeding or ulceration.  Present " "constantly; nothing seems to make it worse or better.  No prior treatment.      Assessment and Plan:  Based on a thorough discussion of this condition and the management approach to it (including a comprehensive discussion of the known risks, side effects and potential benefits of treatment), the patient (family) agrees to implement the following specific plan:  Reassured benign    ANGIOMA (\"CHERRY ANGIOMA\")    Physical Exam:  Anatomic Location: scattered across sun exposed areas of the trunk and extremities   Morphologic Description: Firm red to reddish-blue discrete papules  Pertinent Positives:  Pertinent Negatives:    Additional History of Present Condition:  Present on exam.     Assessment and Plan:  Reassured benign    SEBACEOUS HYPERPLASIA    Physical Exam:  Anatomic Location Affected:  forehead  Morphological Description:  yellow pink papules some with central dell  Pertinent Positives:  Pertinent Negatives:    Additional History of Present Condition:  present upon skin exam    Assessment and Plan:  Based on a thorough discussion of this condition and the management approach to it (including a comprehensive discussion of the known risks, side effects and potential benefits of treatment), the patient (family) agrees to implement the following specific plan:  Reassured benign    Sebaceous Hyperplasia  Sebaceous hyperplasia is a common, benign condition of enlarged oil secreting (sebaceous) glands commonly found on the forehead and cheeks of middle-aged and elderly patients. They normally appear as small yellow bumps up to 3mm in diameter that can be single or multiple. The bumps on the face often display a centrall dell. Occasionally, these bumps can occur on the chest, areola, mouth, and genitals. Rarely, they can grow to take a giant form, or be arranged linearly.     Causes of sebaceous hyperplasia  Sebaceous hyperplasic is a form of benign hair follicle tumor and can often be confused with basal cell " "carcinoma. It can be more prevalent in immunosuppressed patients such as those undergoing organ transplantation. In the rare Giacomo-Arturo syndrome, sebaceous hyperplasia occurs in association with internal cancers.  Lesions of sebaceous hyperplasia are benign, with no known potential for malignant transformation, but they may be associated with nonmelanoma skin cancer in transplantation patients.     How we do diagnose sebaceous hyperplasia?  Your dermatologist may take a closer look at the bumps with a device called a dermatoscope. Common features include a central hair follicle surrounded by yellowish lobules with prominent blood vessels.     What is the treatment of sebaceous hyperplasia?   Since sebaceous hyperplasia is benign with no known potential for transformation into cancer, treatment is mostly for cosmetic reasons or if the lesions become irritated. Options include   Light electrocautery or laser vaporization  Oral isotrentinoin is effective for extensive or disfiguring spots, but do not prevent recurrence   Antiandrogens may be used in females to decrease the size and improve overall appearance of bumps     STATIS DERMATITIS (\"VENUS ECZEMA\")    Physical Exam:  Anatomic Location Affected:  bilateral lower legs  Morphological Description:  protruding veins  Pertinent Positives:  Pertinent Negatives: no edema    Additional History of Present Condition:  history of stasis ulcer on right shin-had to have unna boot. Well-healed. Patient saw vascular and opted not to have procedure done for her varicose veins     Assessment and Plan:  Based on a thorough discussion of this condition and the management approach to it (including a comprehensive discussion of the known risks, side effects and potential benefits of treatment), the patient (family) agrees to implement the following specific plan:  Continue to wear compression stockings daily    What is venous eczema?  Venous eczema is a common form of " eczema/dermatitis that affects one or both lower legs in association with venous insufficiency. It is also called gravitational dermatitis.    Who gets venous eczema?  Venous eczema is most often seen in middle-aged and elderly patients -- it is reported to affect 20% of those over 70 years. It is associated with:  History of deep venous thrombosis in an affected limb  History of cellulitis in an affected limb  Chronic swelling of the lower leg, aggravated by hot weather and prolonged standing  Varicose veins  Venous leg ulcers.    What causes venous eczema?  Venous eczema appears to be due to fluid collecting in the tissues and activation of the innate immune response.    Normally during walking the leg muscles pump blood upwards and valves in the veins prevent pooling. A clot in the deep leg veins (deep venous thrombosis or DVT) or varicose veins may damage the valves. As a result back pressure develops and fluid collects in the tissues. An inflammatory reaction occurs.    What are the clinical features of venous eczema?  Venous eczema can form discrete patches or become confluent and circumferential. Features include:  Itchy red, blistered and crusted plaques; or dry fissured and scaly plaques on one or both lower legs  Orange-brown macular pigmentation due to haemosiderin deposition  Atrophie shelton (white irregular scars surrounded by red spots)  'Champagne bottle' shape of the lower leg -- narrowing at the ankles and induration (lipodermatosclerosis)    What are the complications of venous eczema?  Impetiginisation -- secondary infection with Staphylococcus aureus resulting in yellowish crusts  Cellulitis -- infection with Streptococcus pyogenes: there may be redness, swelling, pain, fever, a red streak up the leg and swollen nodes in the groin  Secondary eczema -- the eczema spreads to other areas on the body  Contact allergy to one or more components of the ointments or creams used    How is venous eczema  diagnosed?  The diagnosis of venous eczema is clinical.  Patch tests may be undertaken if there is suspicion of contact allergy.    What is the treatment for venous eczema?  Reduce swelling in the leg  Don't stand for long periods.  Take regular walks.  Elevate your feet when sitting: if your legs are swollen they need to be above your hips to drain effectively.  Elevate the foot of your bed overnight.  During the acute phase of eczema, bandaging is important to reduce swelling.  When eczema has settled, wear graduated compression socks or stockings long term. Fitted moderate to high compression socks can be obtained from a surgical supplies company. Light compression using travel socks may be adequate, and these are easy to put on. They can be bought at pharmacies, travel and sports stores. More compression is obtained by wearing two pairs.  Horse chestnut extract appears to be of benefit for at least some patients with venous disease.    Treat the eczema  Dry up oozing patches with Condy's solution (potassium permanganate) or dilute vinegar on gauze as compresses.  Oral antibiotics such as flucloxacillin may be prescribed for a secondary infection.  Apply a prescribed topical steroid: start with a potent steroid cream applied accurately daily to the patches until they have flattened out. After a few days, change to a milder steroid cream (eg. hydrocortisone) until the itchy patches have resolved (maintenance treatment). Check with your doctor if you are using steroid creams for more than a few weeks. Overuse can thin the skin, but short courses of stronger preparations can be used from time to time if necessary to control dermatitis. Coal tar ointment may also help.  Use a moisturizing cream frequently to keep the skin on the legs smooth and soft. If the skin is very scaly, urea cream may be especially effective.  Protect your skin from injury: this can result in infection or ulceration that may be difficult to  heal.    Treatment for varicose veins  Seek the opinion of a vascular surgeon regarding varicose veins  These can be treated surgically or sclerotherapy.  Varicose veins may develop again after an apparently successful operation because venous disease is progressive.    How can venous eczema be prevented?  Venous eczema cannot be completely prevented but the number and severity of flare-ups can be reduced by the following measures.  Avoid prolonged standing or sitting with legs down  Wear compression socks or stockings  Avoid and treat leg swelling  Apply emollients frequently and regularly to dry skin  Avoid soap; use water alone or non-soap cleansers when bathing    What is the outlook for venous eczema?  Venous eczema tends to be a recurring or chronic disorder lifelong. Treat recurrence promptly with topical steroids.    Scribe Attestation      I,:  Sarha Ku am acting as a scribe while in the presence of the attending physician.:       I,:  Evi Lyon PA-C personally performed the services described in this documentation    as scribed in my presence.:

## 2024-07-23 ENCOUNTER — OFFICE VISIT (OUTPATIENT)
Dept: FAMILY MEDICINE CLINIC | Facility: MEDICAL CENTER | Age: 44
End: 2024-07-23
Payer: COMMERCIAL

## 2024-07-23 VITALS
DIASTOLIC BLOOD PRESSURE: 80 MMHG | HEART RATE: 94 BPM | SYSTOLIC BLOOD PRESSURE: 130 MMHG | RESPIRATION RATE: 18 BRPM | WEIGHT: 174.6 LBS | BODY MASS INDEX: 30.94 KG/M2 | OXYGEN SATURATION: 94 % | HEIGHT: 63 IN | TEMPERATURE: 97.8 F

## 2024-07-23 DIAGNOSIS — E04.9 GOITER: ICD-10-CM

## 2024-07-23 DIAGNOSIS — E55.9 VITAMIN D DEFICIENCY: ICD-10-CM

## 2024-07-23 DIAGNOSIS — E78.2 MIXED HYPERLIPIDEMIA: Primary | ICD-10-CM

## 2024-07-23 DIAGNOSIS — E66.9 OBESITY (BMI 30-39.9): ICD-10-CM

## 2024-07-23 DIAGNOSIS — D50.0 IRON DEFICIENCY ANEMIA DUE TO CHRONIC BLOOD LOSS: ICD-10-CM

## 2024-07-23 PROBLEM — D12.2 ADENOMATOUS POLYP OF ASCENDING COLON: Status: ACTIVE | Noted: 2024-07-23

## 2024-07-23 PROBLEM — E66.3 OVERWEIGHT: Status: RESOLVED | Noted: 2023-12-20 | Resolved: 2024-07-23

## 2024-07-23 PROCEDURE — 99214 OFFICE O/P EST MOD 30 MIN: CPT | Performed by: INTERNAL MEDICINE

## 2024-07-23 RX ORDER — QUINIDINE GLUCONATE 324 MG
1 TABLET, EXTENDED RELEASE ORAL 2 TIMES DAILY
Qty: 180 TABLET | Refills: 1 | Status: SHIPPED | OUTPATIENT
Start: 2024-07-23

## 2024-07-23 RX ORDER — ROSUVASTATIN CALCIUM 40 MG/1
40 TABLET, COATED ORAL DAILY
Qty: 90 TABLET | Refills: 1 | Status: SHIPPED | OUTPATIENT
Start: 2024-07-23

## 2024-07-23 NOTE — PROGRESS NOTES
INTERNAL MEDICINE OFFICE VISIT  [facility] [place] [address]  Tel: [phone]      NAME: Kimberley Frey  AGE: 44 y.o.  SEX: female  : 1980   MRN: [id number]    DATE: 2024  TIME: 10:49 AM      Assessment and Plan:  1. Mixed hyperlipidemia  Cholesterol is well-controlled, continue the rosuvastatin  - rosuvastatin (CRESTOR) 40 MG tablet; Take 1 tablet (40 mg total) by mouth daily  Dispense: 90 tablet; Refill: 1    2. Vitamin D deficiency  Continue vitamin D and check a level    3. Goiter  Stable, check TSH    4. Iron deficiency anemia due to chronic blood loss    - ferrous gluconate (Fergon) 240 (27 FE) MG tablet; Take 1 tablet (240 mg total) by mouth 2 (two) times a day  Dispense: 180 tablet; Refill: 1    5. Obesity (BMI 30-39.9)  Was told to lose weight      - Counseling Documentation: patient was counseled regarding: diagnostic results, instructions for management, risk factor reductions, prognosis, patient and family education, risks and benefits of treatment options, and importance of compliance with treatment  - Medication Side Effects: Adverse side effects of medications were reviewed with the patient/guardian today.      Return for follow up visit in 6 months or earlier, if needed.      Chief Complaint:  Chief Complaint   Patient presents with    Physical Exam     Annual /follow up         History of Present Illness:   Patient is doing very well and does not have any symptoms.  She has been taking the Crestor regularly and her lipid panel has been stable  Vitamin D is stable  She continues to take iron tablets for the iron deficiency anemia.  Needs to lose weight and eat healthy      Active Problem List:  Patient Active Problem List   Diagnosis    Hypoalbuminemia    Venous insufficiency    Varicose veins of both lower extremities with pain    Goiter    Vitamin D deficiency    Intramural leiomyoma of uterus    Herpes simplex  labialis    Mixed hyperlipidemia    Obesity (BMI 30-39.9)    Adenomatous polyp of ascending colon         Past Medical History:  Past Medical History:   Diagnosis Date    Benign neoplasm of skin     Brain cancer (HCC)     pt states benign tumor     Environmental and seasonal allergies     Hyperlipidemia     Hyperparathyroidism, primary (HCC)     last assessed 2015    Menorrhagia     Thyroid cyst     Varicose vein of leg          Past Surgical History:  Past Surgical History:   Procedure Laterality Date    BRAIN SURGERY      Neurosurgery for brain benign tumor per pt report;  last assessed 2014     SECTION      FOOT SURGERY Left     PARATHYROIDECTOMY           Family History:  Family History   Problem Relation Age of Onset    Diabetes Mother     Heart disease Mother     COPD Mother     Hypertension Mother     Hyperlipidemia Mother     Stroke Mother     Diabetes Father     Heart disease Father     Alcohol abuse Father     Cirrhosis Father     Hypertension Father     Colon cancer Maternal Grandmother     Ovarian cancer Maternal Grandmother     Thyroid disease Sister          Social History:  Social History     Socioeconomic History    Marital status: /Civil Union     Spouse name: None    Number of children: None    Years of education: None    Highest education level: None   Occupational History    None   Tobacco Use    Smoking status: Never    Smokeless tobacco: Never   Vaping Use    Vaping status: Never Used   Substance and Sexual Activity    Alcohol use: Not Currently     Comment:  one drink every 2 months    Drug use: No    Sexual activity: Yes     Partners: Male   Other Topics Concern    None   Social History Narrative    None     Social Determinants of Health     Financial Resource Strain: Not on file   Food Insecurity: Not on file   Transportation Needs: Not on file   Physical Activity: Sufficiently Active (2022)    Exercise Vital Sign     Days of Exercise per Week: 7 days      Minutes of Exercise per Session: 30 min   Stress: Stress Concern Present (10/28/2020)    [employer] - Occupational Stress Questionnaire     Feeling of Stress : Very much   Social Connections: Not on file   Intimate Partner Violence: Not on file   Housing Stability: Not on file         Allergies:  Allergies   Allergen Reactions    Pollen Extract Sneezing and Nasal Congestion     Depending on the season         Medications:    Current Outpatient Medications:     ferrous gluconate (Fergon) 240 (27 FE) MG tablet, Take 1 tablet (240 mg total) by mouth 2 (two) times a day, Disp: 180 tablet, Rfl: 1    rosuvastatin (CRESTOR) 40 MG tablet, Take 1 tablet (40 mg total) by mouth daily, Disp: 90 tablet, Rfl: 1    valACYclovir (VALTREX) 500 mg tablet, Take 1 tablet (500 mg total) by mouth 2 (two) times a day (Patient taking differently: Take 500 mg by mouth 2 (two) times a day As needed), Disp: 60 tablet, Rfl: 11    norgestimate-ethinyl estradiol (ORTHO TRI-CYCLEN LO) 0.18/0.215/0.25 MG-25 MCG per tablet, Take 1 tablet by mouth daily  , Disp: , Rfl:       The following portions of the patient's history were reviewed and updated as appropriate: past medical history, past surgical history, family history, social history, allergies, current medications and active problem list.      Review of Systems:  Constitutional: Denies fever, chills, weight gain, weight loss, fatigue  Eyes: Denies eye redness, eye discharge, double vision, change in visual acuity  ENT: Denies hearing loss, tinnitus, sneezing, nasal congestion, nasal discharge, sore throat   Respiratory: Denies cough, expectoration, hemoptysis, shortness of breath, wheezing  Cardiovascular: Denies chest pain, palpitations, lower extremity swelling, orthopnea, PND  Gastrointestinal: Denies abdominal pain, heartburn, nausea, vomiting, hematemesis, diarrhea, bloody stools  Genito-Urinary: Denies dysuria, frequency, difficulty in micturition, nocturia,  incontinence  Musculoskeletal: Denies back pain, joint pain, muscle pain  Neurologic: Denies confusion, lightheadedness, syncope, headache, focal weakness, sensory changes, seizures  Endocrine: Denies polyuria, polydipsia, temperature intolerance  Allergy and Immunology: Denies hives, insect bite sensitivity  Hematological and Lymphatic: Denies bleeding problems, swollen glands   Psychological: Denies depression, suicidal ideation, anxiety, panic, mood swings  Dermatological: Denies pruritus, rash, skin lesion changes      Vitals:  Vitals:    07/23/24 1044   BP: 130/80   Pulse:    Resp:    Temp:    SpO2:        Body mass index is 30.93 kg/m².    Weight (last 2 days)       Date/Time Weight    07/23/24 1024 79.2 (174.6)              Physical Examination:  General: Patient is not in acute distress. Awake, alert, responding to commands. No weight gain or loss  Head: Normocephalic. Atraumatic  Eyes: Conjunctiva and lids with no swelling, erythema or discharge. Both pupils normal sized, round and reactive to light. Sclera nonicteric  ENT: External examination of nose and ear normal. Otoscopic examination shows translucent tympanic membranes with patent canals without erythema. Oropharynx moist with no erythema, edema, exudate or lesions  Neck: Supple. JVP not raised. Trachea midline. No masses. No thyromegaly  Lungs: No signs of increased work of breathing or respiratory distress. Bilateral bronchovascular breath sounds with no crackles or rhonchi  Chest wall: No tenderness  Cardiovascular: Normal PMI. No thrills. Regular rate and rhythm. S1 and S2 normal. No murmur, rub or gallop  Gastrointestinal: Abdomen soft, nontender. No guarding or rigidity. Liver and spleen not palpable. Bowel sounds present  Neurologic: Cranial nerves II-XII intact. Cortical functions normal. Motor system - Reflexes 2+ and symmetrical. Sensations normal  Musculoskeletal: Gait normal. No joint tenderness  Integumentary: Skin normal with no rash or  "lesions  Lymphatic: No palpable lymph nodes in neck, axilla or groin  Extremities: No clubbing, cyanosis, edema or varicosities  Psychological: Judgement and insight normal. Mood and affect normal      Laboratory Results:  CBC with diff:   Lab Results   Component Value Date    WBC 8.76 06/14/2023    WBC 6.6 06/05/2015    RBC 4.30 06/14/2023    RBC 4.52 06/05/2015    HGB 12.0 06/14/2023    HGB 13.1 06/05/2015    HCT 39.3 06/14/2023    HCT 39.7 06/05/2015    MCV 91 06/14/2023    MCV 88 06/05/2015    MCH 27.9 06/14/2023    MCH 29.0 06/05/2015    RDW 14.7 06/14/2023    RDW 12.3 06/05/2015     (H) 06/14/2023     06/05/2015       CMP:  Lab Results   Component Value Date    CREATININE 0.86 06/14/2023    CREATININE 0.6 06/05/2015    BUN 11 06/14/2023    BUN 14 06/05/2015     06/05/2015    K 4.0 06/14/2023    K 4.6 06/05/2015     (H) 06/14/2023     06/05/2015    CO2 24 06/14/2023    CO2 27.7 06/05/2015    GLUCOSE 81 06/05/2015    PROT 6.7 06/05/2015    ALKPHOS 54 06/14/2023    ALKPHOS 60 06/05/2015    ALT 25 06/14/2023    ALT 31 06/05/2015    AST 16 06/14/2023    AST 11 (L) 06/05/2015       Lab Results   Component Value Date    HGBA1C 5.2 06/14/2023       No results found for: \"TROPONINI\", \"CKMB\", \"CKTOTAL\"    Lipid Profile:   Lab Results   Component Value Date    CHOL 219 06/05/2015    CHOL 226 02/06/2014     Lab Results   Component Value Date    HDL 48 (L) 06/14/2023    HDL 44 (L) 12/12/2022     Lab Results   Component Value Date    LDLCALC 61 06/14/2023    LDLCALC 47 12/12/2022     Lab Results   Component Value Date    TRIG 277 (H) 06/14/2023    TRIG 266 (H) 12/12/2022       Imaging Results:  Colonoscopy  Addendum: [facility]   [address]   [place] [address]   [phone]     DATE OF SERVICE:   1/23/24     PHYSICIAN(S):   Attending:    Vitaly Willams MD      Fellow:    No Staff Documented      INDICATION:   Colon cancer screening     POST-OP " DIAGNOSIS:   See the impression below.     HISTORY:   Prior colonoscopy: No prior colonoscopy.     BOWEL PREPARATION:   Miralax/Dulcolax     PREPROCEDURE:   Informed consent was obtained for the procedure, including sedation. Risks  including but not limited to bleeding, infection, perforation, adverse  drug reaction and aspiration were explained in detail. Also explained  about less than 100% sensitivity with the exam and other alternatives. The  patient was placed in the left lateral decubitus position.     Procedure: Colonoscopy      DETAILS OF PROCEDURE:    Patient was taken to the procedure room where a time out was performed to  confirm correct patient and correct procedure. The patient underwent  monitored anesthesia care, which was administered by an anesthesia  professional. The patient's blood pressure, heart rate, level of  consciousness, oxygen, respirations and ECG were monitored throughout the  procedure. A digital rectal exam was performed. The scope was introduced  through the anus and advanced to the cecum. Retroflexion was performed in  the rectum. The quality of bowel preparation was evaluated using the  [place] Bowel Preparation Scale with scores of: right colon = 2, transverse  colon = 2, left colon = 2. The total BBPS score was 6. Bowel prep was  adequate. The patient experienced no blood loss. The procedure was not  difficult. The patient tolerated the procedure well. There were no  apparent adverse events.      ANESTHESIA INFORMATION:   ASA: II   Anesthesia Type: Anesthesia type not filed in the log.     MEDICATIONS:   No administrations occurring from 1040 to 1054 on 01/23/24      FINDINGS:   One sessile polyp measuring smaller than 5 mm in the ascending colon;  performed cold snare removal   The ileocecal valve, cecum, transverse colon, splenic flexure, descending  colon, sigmoid colon, rectosigmoid and rectum appeared normal.     EVENTS:   Procedure Events    Event Event Time    ENDO CECUM  REACHED 1/23/2024 10:46 AM    ENDO SCOPE OUT TIME 1/23/2024 10:53 AM      SPECIMENS:   ID Type Source Tests Collected by Time Destination    1 : ascending Tissue Polyp, Colorectal TISSUE EXAM Vitaly Willams MD  1/23/2024 10:51 AM       EQUIPMENT:   Colonoscope -[id number]    ENDOCUFF VISION MED BLUE ID [id number]     IMPRESSION:   Subcentimeter polyp in the ascending colon was removed with cold snare   The ileocecal valve, cecum, transverse colon, splenic flexure, descending  colon, sigmoid colon, rectosigmoid and rectum appeared normal.     RECOMMENDATION:    Repeat colonoscopy in 5 years     Personal history of colon polyps                     Vitaly Willams MD  Narrative: Table formatting from the original result was not included.   [facility]  [address] [address]  [phone]    DATE OF SERVICE:  1/23/24    PHYSICIAN(S):  Attending:   Vitaly Willams MD     Fellow:   No Staff Documented     INDICATION:  Colon cancer screening    POST-OP DIAGNOSIS:  See the impression below.    HISTORY:  Prior colonoscopy: No prior colonoscopy.    BOWEL PREPARATION:  Miralax/Dulcolax    PREPROCEDURE:  Informed consent was obtained for the procedure, including sedation. Risks   including but not limited to bleeding, infection, perforation, adverse   drug reaction and aspiration were explained in detail. Also explained   about less than 100% sensitivity with the exam and other alternatives. The   patient was placed in the left lateral decubitus position.    Procedure: Colonoscopy     DETAILS OF PROCEDURE:   Patient was taken to the procedure room where a time out was performed to   confirm correct patient and correct procedure. The patient underwent   monitored anesthesia care, which was administered by an anesthesia   professional. The patient's blood pressure, heart rate, level of   consciousness, oxygen, respirations and ECG were monitored throughout the   procedure. A digital rectal exam  was performed. The scope was introduced   through the anus and advanced to the cecum. Retroflexion was performed in   the rectum. The quality of bowel preparation was evaluated using the   [place] Bowel Preparation Scale with scores of: right colon = 2, transverse   colon = 2, left colon = 2. The total BBPS score was 6. Bowel prep was   adequate. The patient experienced no blood loss. The procedure was not   difficult. The patient tolerated the procedure well. There were no   apparent adverse events.     ANESTHESIA INFORMATION:  ASA: II  Anesthesia Type: Anesthesia type not filed in the log.    MEDICATIONS:  No administrations occurring from 1040 to 1054 on 01/23/24     FINDINGS:  One sessile polyp measuring smaller than 5 mm in the ascending colon;   performed cold snare removal  The ileocecal valve, cecum, transverse colon, splenic flexure, descending   colon, sigmoid colon, rectosigmoid and rectum appeared normal.    EVENTS:  Procedure Events   Event Event Time   ENDO CECUM REACHED 1/23/2024 10:46 AM   ENDO SCOPE OUT TIME 1/23/2024 10:53 AM     SPECIMENS:  ID Type Source Tests Collected by Time Destination   1 : ascending Tissue Polyp, Colorectal TISSUE EXAM Vitaly Willams MD   1/23/2024 10:51 AM      EQUIPMENT:  Colonoscope -[id number]   ENDOCUFF VISION MED BLUE ID [id number]  Impression: Subcentimeter polyp in the ascending colon was removed with cold snare  The ileocecal valve, cecum, transverse colon, splenic flexure, descending   colon, sigmoid colon, rectosigmoid and rectum appeared normal.    RECOMMENDATION:   Repeat colonoscopy in 7 years    Personal history of colon polyps                 Vitaly Willams MD        Health Maintenance:  Health Maintenance   Topic Date Due    Annual Physical  Never done    DTaP,Tdap,and Td Vaccines (1 - Tdap) 06/18/1999    COVID-19 Vaccine (3 - 2023-24 season) 09/01/2023    Influenza Vaccine (1) 09/01/2024    Breast Cancer Screening: Mammogram  11/02/2024    Depression Screening   07/23/2025    Cervical Cancer Screening  09/28/2025    Zoster Vaccine (1 of 2) 06/18/2028    Colorectal Cancer Screening  01/21/2029    RSV Vaccine Age 60+ Years (1 - 1-dose 60+ series) 06/18/2038    HIV Screening  Completed    Hepatitis C Screening  Completed    RSV Vaccine age 0-20 Months  Aged Out    Pneumococcal Vaccine: Pediatrics (0 to 5 Years) and At-Risk Patients (6 to 64 Years)  Aged Out    HIB Vaccine  Aged Out    IPV Vaccine  Aged Out    Hepatitis A Vaccine  Aged Out    Meningococcal ACWY Vaccine  Aged Out    HPV Vaccine  Aged Out     Immunization History   Administered Date(s) Administered    COVID-19 PFIZER VACCINE 0.3 ML IM 05/01/2021, 05/25/2021    Influenza, injectable, quadrivalent, preservative free 0.5 mL 10/28/2020    Influenza, seasonal, injectable 1980    Tdap 1980         Yulia Ugarte MD  7/23/2024,10:49 AM

## 2024-10-21 ENCOUNTER — OFFICE VISIT (OUTPATIENT)
Dept: URGENT CARE | Facility: CLINIC | Age: 44
End: 2024-10-21
Payer: COMMERCIAL

## 2024-10-21 VITALS
DIASTOLIC BLOOD PRESSURE: 78 MMHG | OXYGEN SATURATION: 98 % | RESPIRATION RATE: 18 BRPM | SYSTOLIC BLOOD PRESSURE: 134 MMHG | TEMPERATURE: 97.6 F | HEART RATE: 105 BPM

## 2024-10-21 DIAGNOSIS — J06.9 VIRAL UPPER RESPIRATORY TRACT INFECTION: Primary | ICD-10-CM

## 2024-10-21 PROCEDURE — 99213 OFFICE O/P EST LOW 20 MIN: CPT | Performed by: PHYSICIAN ASSISTANT

## 2024-10-21 RX ORDER — BENZONATATE 200 MG/1
200 CAPSULE ORAL 3 TIMES DAILY PRN
Qty: 20 CAPSULE | Refills: 0 | Status: SHIPPED | OUTPATIENT
Start: 2024-10-21

## 2024-10-21 NOTE — PROGRESS NOTES
[facility]        NAME: Kimberley Frey is a 44 y.o. female  : 1980    MRN: [id number]  DATE: 2024  TIME: 11:05 AM    Assessment and Plan   Viral upper respiratory tract infection [J06.9]  1. Viral upper respiratory tract infection  benzonatate (TESSALON) 200 MG capsule            Patient Instructions       Follow up with PCP in 3-5 days.  Proceed to  ER if symptoms worsen.    If tests have been performed at [facility], our office will contact you with results if changes need to be made to the care plan discussed with you at the visit.  You can review your full results on [facility].    Chief Complaint     Chief Complaint   Patient presents with    Cough     Pt had cold symptoms and cough that started 8 days ago. Pt still has chest congestion and cough. No hx of asthma. Was taking robitussin          History of Present Illness       Patient presents with 8 days of cold like symptoms including congestion, runny nose, cough, chest congestion.  Congestion and runny nose improving but still with persisting cough and chest congestion.   Denies new/worsening symptoms, chest pains, SOB, dyspnea, or fevers.   Tried OTC medications.     Cough  Associated symptoms include rhinorrhea. Pertinent negatives include no chest pain, chills, ear pain, fever, myalgias, postnasal drip, sore throat, shortness of breath or wheezing.       Review of Systems   Review of Systems   Constitutional:  Negative for chills, fatigue and fever.   HENT:  Positive for congestion and rhinorrhea. Negative for ear discharge, ear pain, postnasal drip, sinus pressure, sinus pain and sore throat.    Respiratory:  Positive for cough. Negative for shortness of breath and wheezing.    Cardiovascular:  Negative for chest pain and palpitations.   Musculoskeletal:  Negative for arthralgias and myalgias.   Neurological:  Negative for weakness.   Psychiatric/Behavioral:  Negative for confusion.          Current Medications        Current Outpatient Medications:     benzonatate (TESSALON) 200 MG capsule, Take 1 capsule (200 mg total) by mouth 3 (three) times a day as needed for cough, Disp: 20 capsule, Rfl: 0    ferrous gluconate (Fergon) 240 (27 FE) MG tablet, Take 1 tablet (240 mg total) by mouth 2 (two) times a day, Disp: 180 tablet, Rfl: 1    norgestimate-ethinyl estradiol (ORTHO TRI-CYCLEN LO) 0.18/0.215/0.25 MG-25 MCG per tablet, Take 1 tablet by mouth daily  , Disp: , Rfl:     rosuvastatin (CRESTOR) 40 MG tablet, Take 1 tablet (40 mg total) by mouth daily, Disp: 90 tablet, Rfl: 1    valACYclovir (VALTREX) 500 mg tablet, Take 1 tablet (500 mg total) by mouth 2 (two) times a day (Patient taking differently: Take 500 mg by mouth 2 (two) times a day As needed), Disp: 60 tablet, Rfl: 11    Current Allergies     Allergies as of 10/21/2024 - Reviewed 10/21/2024   Allergen Reaction Noted    Pollen extract Sneezing and Nasal Congestion 2020            The following portions of the patient's history were reviewed and updated as appropriate: allergies, current medications, past family history, past medical history, past social history, past surgical history and problem list.     Past Medical History:   Diagnosis Date    Benign neoplasm of skin     Brain cancer (HCC)     pt states benign tumor     Environmental and seasonal allergies     Hyperlipidemia     Hyperparathyroidism, primary (HCC)     last assessed 2015    Menorrhagia     Thyroid cyst     Varicose vein of leg        Past Surgical History:   Procedure Laterality Date    BRAIN SURGERY      Neurosurgery for brain benign tumor per pt report;  last assessed 2014     SECTION      FOOT SURGERY Left     PARATHYROIDECTOMY         Family History   Problem Relation Age of Onset    Diabetes Mother     Heart disease Mother     COPD Mother     Hypertension Mother     Hyperlipidemia Mother     Stroke Mother     Diabetes Father     Heart disease Father     Alcohol abuse  Father     Cirrhosis Father     Hypertension Father     Colon cancer Maternal Grandmother     Ovarian cancer Maternal Grandmother     Thyroid disease Sister          Medications have been verified.        Objective   /78   Pulse 105   Temp 97.6 °F (36.4 °C)   Resp 18   SpO2 98%   No LMP recorded.       Physical Exam     Physical Exam  Constitutional:       General: She is not in acute distress.     Appearance: Normal appearance. She is not ill-appearing or diaphoretic.   HENT:      Right Ear: Tympanic membrane, ear canal and external ear normal.      Left Ear: Tympanic membrane, ear canal and external ear normal.      Nose: Nose normal. No congestion or rhinorrhea.      Mouth/Throat:      Mouth: Mucous membranes are moist.      Pharynx: Oropharynx is clear.   Eyes:      Conjunctiva/sclera: Conjunctivae normal.   Cardiovascular:      Rate and Rhythm: Normal rate and regular rhythm.      Heart sounds: Normal heart sounds.   Pulmonary:      Effort: Pulmonary effort is normal.      Breath sounds: Normal breath sounds. No wheezing, rhonchi or rales.   Skin:     General: Skin is warm and dry.   Neurological:      Mental Status: She is alert.   Psychiatric:         Mood and Affect: Mood normal.         Behavior: Behavior normal.

## 2025-01-28 ENCOUNTER — OFFICE VISIT (OUTPATIENT)
Dept: FAMILY MEDICINE CLINIC | Facility: MEDICAL CENTER | Age: 45
End: 2025-01-28
Payer: COMMERCIAL

## 2025-01-28 ENCOUNTER — APPOINTMENT (OUTPATIENT)
Dept: LAB | Facility: MEDICAL CENTER | Age: 45
End: 2025-01-28
Payer: COMMERCIAL

## 2025-01-28 VITALS
HEIGHT: 63 IN | TEMPERATURE: 98.2 F | OXYGEN SATURATION: 98 % | RESPIRATION RATE: 18 BRPM | SYSTOLIC BLOOD PRESSURE: 102 MMHG | HEART RATE: 89 BPM | DIASTOLIC BLOOD PRESSURE: 70 MMHG | BODY MASS INDEX: 31.86 KG/M2 | WEIGHT: 179.8 LBS

## 2025-01-28 DIAGNOSIS — E04.9 GOITER: ICD-10-CM

## 2025-01-28 DIAGNOSIS — Z00.00 ENCOUNTER FOR WELLNESS EXAMINATION IN ADULT: ICD-10-CM

## 2025-01-28 DIAGNOSIS — E78.2 MIXED HYPERLIPIDEMIA: ICD-10-CM

## 2025-01-28 DIAGNOSIS — B00.1 HERPES SIMPLEX LABIALIS: ICD-10-CM

## 2025-01-28 DIAGNOSIS — E78.2 MIXED HYPERLIPIDEMIA: Primary | ICD-10-CM

## 2025-01-28 DIAGNOSIS — E66.9 OBESITY (BMI 30-39.9): ICD-10-CM

## 2025-01-28 DIAGNOSIS — J30.89 NON-SEASONAL ALLERGIC RHINITIS, UNSPECIFIED TRIGGER: ICD-10-CM

## 2025-01-28 LAB
ALBUMIN SERPL BCG-MCNC: 4.2 G/DL (ref 3.5–5)
ALP SERPL-CCNC: 66 U/L (ref 34–104)
ALT SERPL W P-5'-P-CCNC: 23 U/L (ref 7–52)
ANION GAP SERPL CALCULATED.3IONS-SCNC: 10 MMOL/L (ref 4–13)
AST SERPL W P-5'-P-CCNC: 22 U/L (ref 13–39)
BASOPHILS # BLD AUTO: 0.06 THOUSANDS/ΜL (ref 0–0.1)
BASOPHILS NFR BLD AUTO: 1 % (ref 0–1)
BILIRUB SERPL-MCNC: 0.45 MG/DL (ref 0.2–1)
BUN SERPL-MCNC: 12 MG/DL (ref 5–25)
CALCIUM SERPL-MCNC: 9.5 MG/DL (ref 8.4–10.2)
CHLORIDE SERPL-SCNC: 107 MMOL/L (ref 96–108)
CHOLEST SERPL-MCNC: 164 MG/DL (ref ?–200)
CO2 SERPL-SCNC: 25 MMOL/L (ref 21–32)
CREAT SERPL-MCNC: 0.7 MG/DL (ref 0.6–1.3)
EOSINOPHIL # BLD AUTO: 0.29 THOUSAND/ΜL (ref 0–0.61)
EOSINOPHIL NFR BLD AUTO: 3 % (ref 0–6)
ERYTHROCYTE [DISTWIDTH] IN BLOOD BY AUTOMATED COUNT: 16.2 % (ref 11.6–15.1)
GFR SERPL CREATININE-BSD FRML MDRD: 104 ML/MIN/1.73SQ M
GLUCOSE P FAST SERPL-MCNC: 77 MG/DL (ref 65–99)
HCT VFR BLD AUTO: 37.1 % (ref 34.8–46.1)
HDLC SERPL-MCNC: 43 MG/DL
HGB BLD-MCNC: 11.1 G/DL (ref 11.5–15.4)
IMM GRANULOCYTES # BLD AUTO: 0.02 THOUSAND/UL (ref 0–0.2)
IMM GRANULOCYTES NFR BLD AUTO: 0 % (ref 0–2)
LDLC SERPL CALC-MCNC: 59 MG/DL (ref 0–100)
LYMPHOCYTES # BLD AUTO: 2.05 THOUSANDS/ΜL (ref 0.6–4.47)
LYMPHOCYTES NFR BLD AUTO: 24 % (ref 14–44)
MCH RBC QN AUTO: 25.1 PG (ref 26.8–34.3)
MCHC RBC AUTO-ENTMCNC: 29.9 G/DL (ref 31.4–37.4)
MCV RBC AUTO: 84 FL (ref 82–98)
MONOCYTES # BLD AUTO: 0.85 THOUSAND/ΜL (ref 0.17–1.22)
MONOCYTES NFR BLD AUTO: 10 % (ref 4–12)
NEUTROPHILS # BLD AUTO: 5.21 THOUSANDS/ΜL (ref 1.85–7.62)
NEUTS SEG NFR BLD AUTO: 62 % (ref 43–75)
NONHDLC SERPL-MCNC: 121 MG/DL
NRBC BLD AUTO-RTO: 0 /100 WBCS
PLATELET # BLD AUTO: 386 THOUSANDS/UL (ref 149–390)
PMV BLD AUTO: 10.5 FL (ref 8.9–12.7)
POTASSIUM SERPL-SCNC: 4.3 MMOL/L (ref 3.5–5.3)
PROT SERPL-MCNC: 6.8 G/DL (ref 6.4–8.4)
RBC # BLD AUTO: 4.42 MILLION/UL (ref 3.81–5.12)
SODIUM SERPL-SCNC: 142 MMOL/L (ref 135–147)
TRIGL SERPL-MCNC: 310 MG/DL (ref ?–150)
TSH SERPL DL<=0.05 MIU/L-ACNC: 1.82 UIU/ML (ref 0.45–4.5)
WBC # BLD AUTO: 8.48 THOUSAND/UL (ref 4.31–10.16)

## 2025-01-28 PROCEDURE — 84443 ASSAY THYROID STIM HORMONE: CPT

## 2025-01-28 PROCEDURE — 99214 OFFICE O/P EST MOD 30 MIN: CPT | Performed by: INTERNAL MEDICINE

## 2025-01-28 PROCEDURE — 36415 COLL VENOUS BLD VENIPUNCTURE: CPT

## 2025-01-28 PROCEDURE — 80053 COMPREHEN METABOLIC PANEL: CPT

## 2025-01-28 PROCEDURE — 85025 COMPLETE CBC W/AUTO DIFF WBC: CPT

## 2025-01-28 PROCEDURE — 80061 LIPID PANEL: CPT

## 2025-01-28 PROCEDURE — 99396 PREV VISIT EST AGE 40-64: CPT | Performed by: INTERNAL MEDICINE

## 2025-01-28 RX ORDER — ACETAMINOPHEN AND CODEINE PHOSPHATE 120; 12 MG/5ML; MG/5ML
0.35 SOLUTION ORAL DAILY
COMMUNITY
Start: 2024-11-06 | End: 2025-11-06

## 2025-01-28 RX ORDER — VALACYCLOVIR HYDROCHLORIDE 500 MG/1
500 TABLET, FILM COATED ORAL 2 TIMES DAILY
Qty: 60 TABLET | Refills: 5 | Status: SHIPPED | OUTPATIENT
Start: 2025-01-28 | End: 2025-07-27

## 2025-01-28 RX ORDER — FEXOFENADINE HCL AND PSEUDOEPHEDRINE HCL 180; 240 MG/1; MG/1
1 TABLET, EXTENDED RELEASE ORAL DAILY
Qty: 90 TABLET | Refills: 1 | Status: SHIPPED | OUTPATIENT
Start: 2025-01-28

## 2025-01-28 NOTE — ASSESSMENT & PLAN NOTE
Orders:    valACYclovir (VALTREX) 500 mg tablet; Take 1 tablet (500 mg total) by mouth 2 (two) times a day

## 2025-01-28 NOTE — ASSESSMENT & PLAN NOTE
Continue Crestor, check lipid profile  Orders:    CBC and differential; Future    Comprehensive metabolic panel; Future    Lipid panel; Future    TSH, 3rd generation; Future

## 2025-01-28 NOTE — ASSESSMENT & PLAN NOTE
Orders:    fexofenadine-pseudoephedrine (ALLEGRA-D 24) 180-240 MG per 24 hr tablet; Take 1 tablet by mouth daily

## 2025-01-28 NOTE — PROGRESS NOTES
Adult Annual Physical  Name: Kimberley Frey      : 1980      MRN: [id number]  Encounter Provider: Yulia Ugarte MD  Encounter Date: 2025   Encounter department: [facility]    Assessment & Plan  Mixed hyperlipidemia  Continue Crestor, check lipid profile  Orders:    CBC and differential; Future    Comprehensive metabolic panel; Future    Lipid panel; Future    TSH, 3rd generation; Future    Goiter  Has been stable and asymptomatic       Herpes simplex labialis    Orders:    valACYclovir (VALTREX) 500 mg tablet; Take 1 tablet (500 mg total) by mouth 2 (two) times a day    Non-seasonal allergic rhinitis, unspecified trigger    Orders:    fexofenadine-pseudoephedrine (ALLEGRA-D 24) 180-240 MG per 24 hr tablet; Take 1 tablet by mouth daily    Obesity (BMI 30-39.9)    Was advised to try to lose weight       Encounter for wellness examination in adult         Immunizations and preventive care screenings were discussed with patient today. Appropriate education was printed on patient's after visit summary.    Counseling:  Alcohol/drug use: discussed moderation in alcohol intake, the recommendations for healthy alcohol use, and avoidance of illicit drug use.  Dental Health: discussed importance of regular tooth brushing, flossing, and dental visits.  Injury prevention: discussed safety/seat belts, safety helmets, smoke detectors, carbon monoxide detectors, and smoking near bedding or upholstery.  Sexual health: discussed sexually transmitted diseases, partner selection, use of condoms, avoidance of unintended pregnancy, and contraceptive alternatives.  Exercise: the importance of regular exercise/physical activity was discussed. Recommend exercise 3-5 times per week for at least 30 minutes.          History of Present Illness   Patient is here for follow-up of hyperlipidemia, herpes simplex, allergic rhinitis and obesity.  She has been trying to lose weight.  She is watching her diet but  is not exercising at this point.  Has a lot of symptoms from nonseasonal allergies and wants to take Allegra-D which was ordered.  Taking the Crestor regularly      Adult Annual Physical:  Patient presents for annual physical.     Diet and Physical Activity:  - Diet/Nutrition: well balanced diet, consuming 3-5 servings of fruits/vegetables daily and adequate whole grain intake.  - Exercise: no formal exercise.    Depression Screening:  - PHQ-2 Score: 0    General Health:  - Sleep: sleeps well.  - Hearing: normal hearing bilateral ears.  - Vision: no vision problems.  - Dental: regular dental visits.    /GYN Health:  - Follows with GYN: yes.   - Menopause: perimenopausal.   - Contraception: oral contraceptives.      Advanced Care Planning:  - Has an advanced directive?: no      Review of Systems   Constitutional:  Negative for chills, diaphoresis, fatigue and fever.   HENT:  Positive for rhinorrhea. Negative for congestion, ear discharge, ear pain, hearing loss, postnasal drip, sinus pressure, sinus pain, sneezing, sore throat and voice change.    Eyes:  Negative for pain, discharge, redness and visual disturbance.   Respiratory:  Negative for cough, chest tightness, shortness of breath and wheezing.    Cardiovascular:  Negative for chest pain, palpitations and leg swelling.   Gastrointestinal:  Negative for abdominal distention, abdominal pain, blood in stool, constipation, diarrhea, nausea and vomiting.   Endocrine: Negative for cold intolerance, heat intolerance, polydipsia, polyphagia and polyuria.   Genitourinary:  Negative for dysuria, flank pain, frequency, hematuria and urgency.   Musculoskeletal:  Negative for arthralgias, back pain, gait problem, joint swelling, myalgias, neck pain and neck stiffness.   Skin:  Negative for rash.   Neurological:  Negative for dizziness, tremors, syncope, facial asymmetry, speech difficulty, weakness, light-headedness, numbness and headaches.   Hematological:  Does not  "bruise/bleed easily.   Psychiatric/Behavioral:  Negative for behavioral problems, confusion and sleep disturbance. The patient is not nervous/anxious.      Medical History Reviewed by provider this encounter:  Tobacco  Allergies  Meds  Problems  Med Hx  Surg Hx  Fam Hx     .    Objective   /70 (BP Location: Left arm, Patient Position: Sitting, Cuff Size: Large)   Pulse 89   Temp 98.2 °F (36.8 °C) (Temporal)   Resp 18   Ht 5' 3\" (1.6 m)   Wt 81.6 kg (179 lb 12.8 oz)   SpO2 98%   BMI 31.85 kg/m²     Physical Exam  Constitutional:       General: She is not in acute distress.     Appearance: She is well-developed. She is not diaphoretic.   HENT:      Head: Normocephalic and atraumatic.      Right Ear: External ear normal.      Left Ear: External ear normal.      Nose: Nose normal.   Eyes:      General: No scleral icterus.        Right eye: No discharge.         Left eye: No discharge.      Conjunctiva/sclera: Conjunctivae normal.   Cardiovascular:      Rate and Rhythm: Normal rate and regular rhythm.      Heart sounds: Normal heart sounds. No murmur heard.     No friction rub. No gallop.   Pulmonary:      Effort: Pulmonary effort is normal. No respiratory distress.      Breath sounds: Normal breath sounds. No wheezing or rales.   Abdominal:      General: Bowel sounds are normal. There is no distension.      Palpations: Abdomen is soft.      Tenderness: There is no abdominal tenderness. There is no guarding or rebound.   Musculoskeletal:         General: No tenderness.   Skin:     General: Skin is warm and dry.      Findings: No erythema or rash.   Neurological:      Mental Status: She is alert and oriented to person, place, and time.      Cranial Nerves: No cranial nerve deficit.      Sensory: No sensory deficit.      Motor: No abnormal muscle tone.   Psychiatric:         Behavior: Behavior normal.         "

## 2025-01-29 ENCOUNTER — TELEPHONE (OUTPATIENT)
Age: 45
End: 2025-01-29

## 2025-01-29 ENCOUNTER — RESULTS FOLLOW-UP (OUTPATIENT)
Dept: FAMILY MEDICINE CLINIC | Facility: MEDICAL CENTER | Age: 45
End: 2025-01-29

## 2025-01-29 DIAGNOSIS — D50.0 IRON DEFICIENCY ANEMIA DUE TO CHRONIC BLOOD LOSS: Primary | ICD-10-CM

## 2025-01-29 RX ORDER — ROSUVASTATIN CALCIUM 40 MG/1
40 TABLET, COATED ORAL DAILY
Qty: 30 TABLET | Refills: 0 | Status: SHIPPED | OUTPATIENT
Start: 2025-01-29

## 2025-01-29 RX ORDER — FERROUS SULFATE 324(65)MG
324 TABLET, DELAYED RELEASE (ENTERIC COATED) ORAL
Qty: 90 TABLET | Refills: 2 | Status: SHIPPED | OUTPATIENT
Start: 2025-01-29

## 2025-01-29 NOTE — TELEPHONE ENCOUNTER
Julio with [facility] pharmacy called to say Allegra D 180-240 mg 24 hr requires a prior authorization.    Insurance will cover the generic but generic in on backorder and not known when it will be available.

## 2025-01-29 NOTE — TELEPHONE ENCOUNTER
Patient called in and requested a refill for iron however I didn't see a Iron Meds previously prescribed.    Please advise

## 2025-01-31 NOTE — TELEPHONE ENCOUNTER
Pharmacist, [facility] [place], reports generic Allegra-D is on long term back order.     She suggests either sending prescription for brand only Allegra- D with prior authorization to be done for insurance coverage, or sending prescription for generic Claritin D.

## 2025-02-25 DIAGNOSIS — B00.1 HERPES SIMPLEX LABIALIS: ICD-10-CM

## 2025-02-27 RX ORDER — VALACYCLOVIR HYDROCHLORIDE 500 MG/1
500 TABLET, FILM COATED ORAL 2 TIMES DAILY
Qty: 200 TABLET | Refills: 5 | Status: SHIPPED | OUTPATIENT
Start: 2025-02-27 | End: 2025-05-28

## 2025-03-03 DIAGNOSIS — E78.2 MIXED HYPERLIPIDEMIA: ICD-10-CM

## 2025-03-04 RX ORDER — ROSUVASTATIN CALCIUM 40 MG/1
40 TABLET, COATED ORAL DAILY
Qty: 30 TABLET | Refills: 5 | Status: SHIPPED | OUTPATIENT
Start: 2025-03-04

## 2025-07-15 DIAGNOSIS — E78.2 MIXED HYPERLIPIDEMIA: ICD-10-CM

## 2025-07-15 RX ORDER — ROSUVASTATIN CALCIUM 40 MG/1
40 TABLET, COATED ORAL DAILY
Qty: 30 TABLET | Refills: 5 | Status: SHIPPED | OUTPATIENT
Start: 2025-07-15

## 2025-08-05 ENCOUNTER — OFFICE VISIT (OUTPATIENT)
Dept: FAMILY MEDICINE CLINIC | Facility: MEDICAL CENTER | Age: 45
End: 2025-08-05
Payer: COMMERCIAL

## 2025-08-05 VITALS
HEIGHT: 63 IN | BODY MASS INDEX: 31.04 KG/M2 | OXYGEN SATURATION: 99 % | DIASTOLIC BLOOD PRESSURE: 100 MMHG | WEIGHT: 175.2 LBS | HEART RATE: 75 BPM | TEMPERATURE: 98.8 F | RESPIRATION RATE: 20 BRPM | SYSTOLIC BLOOD PRESSURE: 140 MMHG

## 2025-08-05 DIAGNOSIS — Z88.9 MULTIPLE ALLERGIES: ICD-10-CM

## 2025-08-05 DIAGNOSIS — E78.2 MIXED HYPERLIPIDEMIA: Primary | ICD-10-CM

## 2025-08-05 DIAGNOSIS — Z13.1 SCREENING FOR DIABETES MELLITUS: ICD-10-CM

## 2025-08-05 DIAGNOSIS — G60.9 IDIOPATHIC PERIPHERAL NEUROPATHY: ICD-10-CM

## 2025-08-05 DIAGNOSIS — D50.0 IRON DEFICIENCY ANEMIA DUE TO CHRONIC BLOOD LOSS: ICD-10-CM

## 2025-08-05 DIAGNOSIS — B00.1 HERPES SIMPLEX LABIALIS: ICD-10-CM

## 2025-08-05 PROCEDURE — 99214 OFFICE O/P EST MOD 30 MIN: CPT | Performed by: INTERNAL MEDICINE

## 2025-08-05 RX ORDER — FERROUS SULFATE 324(65)MG
324 TABLET, DELAYED RELEASE (ENTERIC COATED) ORAL
Qty: 90 TABLET | Refills: 2 | Status: SHIPPED | OUTPATIENT
Start: 2025-08-05

## 2025-08-05 RX ORDER — ROSUVASTATIN CALCIUM 40 MG/1
40 TABLET, COATED ORAL DAILY
Qty: 30 TABLET | Refills: 5 | Status: SHIPPED | OUTPATIENT
Start: 2025-08-05

## 2025-08-05 RX ORDER — VALACYCLOVIR HYDROCHLORIDE 500 MG/1
500 TABLET, FILM COATED ORAL 2 TIMES DAILY
Qty: 200 TABLET | Refills: 5 | Status: SHIPPED | OUTPATIENT
Start: 2025-08-05 | End: 2025-11-03

## 2025-08-05 RX ORDER — GABAPENTIN 100 MG/1
100 CAPSULE ORAL
Qty: 90 CAPSULE | Refills: 3 | Status: SHIPPED | OUTPATIENT
Start: 2025-08-05